# Patient Record
Sex: MALE | Race: WHITE | NOT HISPANIC OR LATINO | Employment: FULL TIME | URBAN - METROPOLITAN AREA
[De-identification: names, ages, dates, MRNs, and addresses within clinical notes are randomized per-mention and may not be internally consistent; named-entity substitution may affect disease eponyms.]

---

## 2023-03-24 ENCOUNTER — HOSPITAL ENCOUNTER (OUTPATIENT)
Dept: DATA CONVERSION | Facility: HOSPITAL | Age: 53
End: 2023-03-24
Attending: STUDENT IN AN ORGANIZED HEALTH CARE EDUCATION/TRAINING PROGRAM | Admitting: STUDENT IN AN ORGANIZED HEALTH CARE EDUCATION/TRAINING PROGRAM
Payer: COMMERCIAL

## 2023-03-24 DIAGNOSIS — Z12.11 ENCOUNTER FOR SCREENING FOR MALIGNANT NEOPLASM OF COLON: ICD-10-CM

## 2023-03-24 DIAGNOSIS — K64.4 RESIDUAL HEMORRHOIDAL SKIN TAGS: ICD-10-CM

## 2023-03-24 DIAGNOSIS — K21.9 GASTRO-ESOPHAGEAL REFLUX DISEASE WITHOUT ESOPHAGITIS: ICD-10-CM

## 2023-03-24 DIAGNOSIS — G47.33 OBSTRUCTIVE SLEEP APNEA (ADULT) (PEDIATRIC): ICD-10-CM

## 2023-03-24 DIAGNOSIS — D12.0 BENIGN NEOPLASM OF CECUM: ICD-10-CM

## 2023-03-24 DIAGNOSIS — J45.909 UNSPECIFIED ASTHMA, UNCOMPLICATED (HHS-HCC): ICD-10-CM

## 2023-03-24 DIAGNOSIS — R12 HEARTBURN: ICD-10-CM

## 2023-03-24 DIAGNOSIS — K29.70 GASTRITIS, UNSPECIFIED, WITHOUT BLEEDING: ICD-10-CM

## 2023-04-04 LAB
COMPLETE PATHOLOGY REPORT: NORMAL
CONVERTED CLINICAL DIAGNOSIS-HISTORY: NORMAL
CONVERTED FINAL DIAGNOSIS: NORMAL
CONVERTED FINAL REPORT PDF LINK TO COPY AND PASTE: NORMAL
CONVERTED GROSS DESCRIPTION: NORMAL

## 2023-04-12 DIAGNOSIS — I49.3 FREQUENT PVCS: Primary | ICD-10-CM

## 2023-04-12 DIAGNOSIS — K21.9 GASTROESOPHAGEAL REFLUX DISEASE WITHOUT ESOPHAGITIS: Primary | ICD-10-CM

## 2023-04-12 RX ORDER — METOPROLOL SUCCINATE 25 MG/1
25 TABLET, EXTENDED RELEASE ORAL DAILY
Qty: 30 TABLET | Refills: 5 | Status: SHIPPED | OUTPATIENT
Start: 2023-04-12 | End: 2023-10-09

## 2023-04-12 RX ORDER — OMEPRAZOLE 20 MG/1
20 CAPSULE, DELAYED RELEASE ORAL DAILY
Qty: 30 CAPSULE | Refills: 5 | Status: SHIPPED | OUTPATIENT
Start: 2023-04-12 | End: 2023-11-13 | Stop reason: WASHOUT

## 2023-09-08 VITALS — HEIGHT: 71 IN | WEIGHT: 315 LBS | BODY MASS INDEX: 44.1 KG/M2

## 2023-09-24 PROBLEM — M54.50 LOWER BACK PAIN: Status: ACTIVE | Noted: 2023-09-24

## 2023-09-24 PROBLEM — R13.12 OROPHARYNGEAL DYSPHAGIA: Status: ACTIVE | Noted: 2023-09-24

## 2023-09-24 PROBLEM — R10.32 LEFT LOWER QUADRANT PAIN: Status: ACTIVE | Noted: 2023-09-24

## 2023-09-24 PROBLEM — R10.32 LEFT INGUINAL PAIN: Status: ACTIVE | Noted: 2023-09-24

## 2023-09-24 PROBLEM — M76.71 PERONEAL TENDONITIS, RIGHT: Status: ACTIVE | Noted: 2023-09-24

## 2023-09-24 PROBLEM — R59.9 SWELLING, LYMPH NODES: Status: ACTIVE | Noted: 2023-09-24

## 2023-09-24 PROBLEM — M47.816 DEGENERATIVE JOINT DISEASE (DJD) OF LUMBAR SPINE: Status: ACTIVE | Noted: 2023-09-24

## 2023-09-24 PROBLEM — E11.9 CONTROLLED DIABETES MELLITUS (MULTI): Status: ACTIVE | Noted: 2023-09-24

## 2023-09-24 PROBLEM — R23.2 FLUSHING: Status: ACTIVE | Noted: 2023-09-24

## 2023-09-24 PROBLEM — R93.5 ABNORMAL ABDOMINAL CT SCAN: Status: ACTIVE | Noted: 2023-09-24

## 2023-09-24 PROBLEM — R19.4 BOWEL HABIT CHANGES: Status: ACTIVE | Noted: 2023-09-24

## 2023-09-24 PROBLEM — K62.5 RECTAL BLEEDING: Status: ACTIVE | Noted: 2023-09-24

## 2023-09-24 PROBLEM — J34.89 NASAL OBSTRUCTION: Status: ACTIVE | Noted: 2023-09-24

## 2023-09-24 PROBLEM — E78.00 HYPERCHOLESTEROLEMIA: Status: ACTIVE | Noted: 2023-09-24

## 2023-09-24 PROBLEM — M79.671 PAIN, FOOT, RIGHT, CHRONIC: Status: ACTIVE | Noted: 2023-09-24

## 2023-09-24 PROBLEM — N45.1 EPIDIDYMITIS, LEFT: Status: ACTIVE | Noted: 2023-09-24

## 2023-09-24 PROBLEM — R10.9 ABDOMINAL PAIN: Status: ACTIVE | Noted: 2023-09-24

## 2023-09-24 PROBLEM — M67.88 ACHILLES TENDONOSIS OF RIGHT LOWER EXTREMITY: Status: ACTIVE | Noted: 2023-09-24

## 2023-09-24 PROBLEM — S80.11XA TRAUMATIC HEMATOMA OF RIGHT LOWER LEG: Status: ACTIVE | Noted: 2023-09-24

## 2023-09-24 PROBLEM — I49.3 FREQUENT PVCS: Status: ACTIVE | Noted: 2023-09-24

## 2023-09-24 PROBLEM — H91.90 HEARING LOSS: Status: ACTIVE | Noted: 2023-09-24

## 2023-09-24 PROBLEM — R51.9 HEADACHE: Status: ACTIVE | Noted: 2023-09-24
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
## 2023-09-24 PROBLEM — E66.01 OBESITY, MORBID, BMI 40.0-49.9 (MULTI): Status: ACTIVE | Noted: 2023-09-24

## 2023-09-24 PROBLEM — L60.1 ONYCHOLYSIS: Status: ACTIVE | Noted: 2023-09-24

## 2023-09-24 PROBLEM — J34.89 NASAL DRAINAGE: Status: ACTIVE | Noted: 2023-09-24

## 2023-09-24 PROBLEM — U07.1 COVID-19: Status: ACTIVE | Noted: 2023-09-24

## 2023-09-24 PROBLEM — H93.13 SUBJECTIVE TINNITUS OF BOTH EARS: Status: ACTIVE | Noted: 2023-09-24

## 2023-09-24 PROBLEM — K59.00 CONSTIPATION: Status: ACTIVE | Noted: 2023-09-24

## 2023-09-24 PROBLEM — H90.5 SENSORINEURAL HEARING LOSS OF RIGHT EAR: Status: ACTIVE | Noted: 2023-09-24

## 2023-09-24 PROBLEM — H90.72 MIXED HEARING LOSS OF LEFT EAR: Status: ACTIVE | Noted: 2023-09-24

## 2023-09-24 PROBLEM — H73.893 RETRACTION OF TYMPANIC MEMBRANE OF BOTH EARS: Status: ACTIVE | Noted: 2023-09-24

## 2023-09-24 PROBLEM — R30.0 DYSURIA: Status: ACTIVE | Noted: 2023-09-24

## 2023-09-24 PROBLEM — I25.10 CAD (CORONARY ARTERY DISEASE): Status: ACTIVE | Noted: 2023-09-24

## 2023-09-24 PROBLEM — R90.89 ABNORMAL BRAIN CT: Status: ACTIVE | Noted: 2023-09-24

## 2023-09-24 PROBLEM — H92.03 OTALGIA, BILATERAL: Status: ACTIVE | Noted: 2023-09-24

## 2023-09-24 PROBLEM — R09.81 NASAL CONGESTION: Status: ACTIVE | Noted: 2023-09-24

## 2023-09-24 PROBLEM — R07.9 CHEST PAIN: Status: ACTIVE | Noted: 2023-09-24

## 2023-09-24 PROBLEM — H74.90: Status: ACTIVE | Noted: 2023-09-24

## 2023-09-24 PROBLEM — G47.30 SLEEP APNEA: Status: ACTIVE | Noted: 2023-09-24

## 2023-09-24 PROBLEM — K21.9 GERD WITHOUT ESOPHAGITIS: Status: ACTIVE | Noted: 2023-09-24

## 2023-09-24 PROBLEM — J98.01 BRONCHOSPASM, ACUTE: Status: ACTIVE | Noted: 2023-09-24

## 2023-09-24 PROBLEM — R07.81 RIB PAIN ON RIGHT SIDE: Status: ACTIVE | Noted: 2023-09-24

## 2023-09-24 PROBLEM — E11.9 DIABETES MELLITUS, NEW ONSET (MULTI): Status: ACTIVE | Noted: 2023-09-24

## 2023-09-24 PROBLEM — R06.09 DYSPNEA ON EXERTION: Status: ACTIVE | Noted: 2023-09-24

## 2023-09-24 PROBLEM — H90.A21 SENSORINEURAL HEARING LOSS (SNHL) OF RIGHT EAR WITH RESTRICTED HEARING OF LEFT EAR: Status: ACTIVE | Noted: 2023-09-24

## 2023-09-24 PROBLEM — M77.32 CALCANEAL SPUR OF BOTH FEET: Status: ACTIVE | Noted: 2023-09-24

## 2023-09-24 PROBLEM — G89.29 PAIN, FOOT, RIGHT, CHRONIC: Status: ACTIVE | Noted: 2023-09-24

## 2023-09-24 PROBLEM — S90.221A SUBUNGUAL HEMATOMA OF TOE, RIGHT, INITIAL ENCOUNTER: Status: ACTIVE | Noted: 2023-09-24

## 2023-09-24 PROBLEM — H92.13 OTORRHEA OF BOTH EARS: Status: ACTIVE | Noted: 2023-09-24

## 2023-09-24 PROBLEM — M77.31 CALCANEAL SPUR OF BOTH FEET: Status: ACTIVE | Noted: 2023-09-24

## 2023-09-24 RX ORDER — ALBUTEROL SULFATE 90 UG/1
1-2 AEROSOL, METERED RESPIRATORY (INHALATION) EVERY 4 HOURS PRN
COMMUNITY
End: 2023-12-19 | Stop reason: SDUPTHER

## 2023-09-24 RX ORDER — OXYCODONE AND ACETAMINOPHEN 5; 325 MG/1; MG/1
1-2 TABLET ORAL EVERY 6 HOURS
COMMUNITY
Start: 2016-10-14 | End: 2023-10-24 | Stop reason: WASHOUT

## 2023-09-24 RX ORDER — AZELASTINE 1 MG/ML
1 SPRAY, METERED NASAL 2 TIMES DAILY
COMMUNITY
Start: 2023-02-08 | End: 2024-05-06 | Stop reason: SDUPTHER

## 2023-09-24 RX ORDER — ROSUVASTATIN CALCIUM 20 MG/1
20 TABLET, COATED ORAL DAILY
COMMUNITY
Start: 2023-07-07 | End: 2024-02-06

## 2023-09-24 RX ORDER — FLUTICASONE PROPIONATE AND SALMETEROL 50; 250 UG/1; UG/1
1 POWDER RESPIRATORY (INHALATION) 2 TIMES DAILY
COMMUNITY
Start: 2022-12-21 | End: 2023-10-24 | Stop reason: WASHOUT

## 2023-09-24 RX ORDER — TRIAMCINOLONE ACETONIDE 55 UG/1
2 SPRAY, METERED NASAL DAILY
COMMUNITY
Start: 2022-04-18 | End: 2023-10-24 | Stop reason: WASHOUT

## 2023-09-24 RX ORDER — OMEPRAZOLE 40 MG/1
40 CAPSULE, DELAYED RELEASE ORAL
COMMUNITY
Start: 2023-01-04 | End: 2024-01-19 | Stop reason: ALTCHOICE

## 2023-09-24 RX ORDER — BENZONATATE 200 MG/1
200 CAPSULE ORAL 3 TIMES DAILY PRN
COMMUNITY
Start: 2023-02-19 | End: 2023-10-24 | Stop reason: WASHOUT

## 2023-09-24 RX ORDER — ETODOLAC 400 MG/1
400 TABLET, FILM COATED ORAL
COMMUNITY
Start: 2022-03-02 | End: 2023-10-24 | Stop reason: WASHOUT

## 2023-09-24 RX ORDER — IBUPROFEN 600 MG/1
600 TABLET ORAL EVERY 6 HOURS
COMMUNITY
Start: 2016-10-14

## 2023-09-24 RX ORDER — METOPROLOL SUCCINATE 25 MG/1
25 TABLET, EXTENDED RELEASE ORAL DAILY
COMMUNITY
Start: 2023-04-27 | End: 2023-10-24 | Stop reason: WASHOUT

## 2023-09-25 PROBLEM — I49.9 ARRHYTHMIA: Status: ACTIVE | Noted: 2023-09-25

## 2023-09-25 PROBLEM — J45.909 UNCOMPLICATED ASTHMA (HHS-HCC): Status: ACTIVE | Noted: 2023-09-25

## 2023-09-25 PROBLEM — J34.89 NASAL DRAINAGE: Status: RESOLVED | Noted: 2023-09-24 | Resolved: 2023-09-25

## 2023-09-25 PROBLEM — J45.909 ASTHMA (HHS-HCC): Status: ACTIVE | Noted: 2023-09-25

## 2023-09-25 PROBLEM — R06.02 SHORTNESS OF BREATH ON EXERTION: Status: ACTIVE | Noted: 2023-09-25

## 2023-09-25 PROBLEM — J32.9 CHRONIC SINUSITIS: Status: ACTIVE | Noted: 2023-09-25

## 2023-09-25 PROBLEM — R06.02 SHORTNESS OF BREATH: Status: ACTIVE | Noted: 2023-09-25

## 2023-09-25 PROBLEM — H70.10 CHRONIC MASTOIDITIS: Status: ACTIVE | Noted: 2023-09-25

## 2023-09-25 PROBLEM — J31.0 CHRONIC RHINITIS: Status: ACTIVE | Noted: 2023-09-25

## 2023-09-25 PROBLEM — M19.071 OSTEOARTHRITIS OF FOOT, RIGHT: Status: ACTIVE | Noted: 2023-09-25

## 2023-09-25 PROBLEM — D12.6 ADENOMATOUS POLYP OF COLON: Status: ACTIVE | Noted: 2023-09-25

## 2023-09-25 PROBLEM — J45.901 ASTHMA EXACERBATION (HHS-HCC): Status: ACTIVE | Noted: 2023-09-25

## 2023-10-21 ENCOUNTER — LAB (OUTPATIENT)
Dept: LAB | Facility: LAB | Age: 53
End: 2023-10-21
Payer: COMMERCIAL

## 2023-10-21 DIAGNOSIS — E78.00 PURE HYPERCHOLESTEROLEMIA, UNSPECIFIED: Primary | ICD-10-CM

## 2023-10-21 LAB
ALBUMIN SERPL BCP-MCNC: 4.2 G/DL (ref 3.4–5)
ALP SERPL-CCNC: 109 U/L (ref 33–120)
ALT SERPL W P-5'-P-CCNC: 13 U/L (ref 10–52)
ANION GAP SERPL CALC-SCNC: 12 MMOL/L (ref 10–20)
AST SERPL W P-5'-P-CCNC: 15 U/L (ref 9–39)
BILIRUB SERPL-MCNC: 0.3 MG/DL (ref 0–1.2)
BUN SERPL-MCNC: 22 MG/DL (ref 6–23)
CALCIUM SERPL-MCNC: 9 MG/DL (ref 8.6–10.3)
CHLORIDE SERPL-SCNC: 106 MMOL/L (ref 98–107)
CHOLEST SERPL-MCNC: 129 MG/DL (ref 0–199)
CHOLESTEROL/HDL RATIO: 3.1
CO2 SERPL-SCNC: 27 MMOL/L (ref 21–32)
CREAT SERPL-MCNC: 0.82 MG/DL (ref 0.5–1.3)
GFR SERPL CREATININE-BSD FRML MDRD: >90 ML/MIN/1.73M*2
GLUCOSE SERPL-MCNC: 127 MG/DL (ref 74–99)
HDLC SERPL-MCNC: 41.6 MG/DL
LDLC SERPL CALC-MCNC: 66 MG/DL
NON HDL CHOLESTEROL: 87 MG/DL (ref 0–149)
POTASSIUM SERPL-SCNC: 4.7 MMOL/L (ref 3.5–5.3)
PROT SERPL-MCNC: 7.1 G/DL (ref 6.4–8.2)
SODIUM SERPL-SCNC: 140 MMOL/L (ref 136–145)
TRIGL SERPL-MCNC: 106 MG/DL (ref 0–149)
VLDL: 21 MG/DL (ref 0–40)

## 2023-10-21 PROCEDURE — 36415 COLL VENOUS BLD VENIPUNCTURE: CPT

## 2023-10-21 PROCEDURE — 80061 LIPID PANEL: CPT

## 2023-10-21 PROCEDURE — 80053 COMPREHEN METABOLIC PANEL: CPT

## 2023-10-24 ENCOUNTER — OFFICE VISIT (OUTPATIENT)
Dept: CARDIOLOGY | Facility: CLINIC | Age: 53
End: 2023-10-24
Payer: COMMERCIAL

## 2023-10-24 VITALS
BODY MASS INDEX: 45.1 KG/M2 | OXYGEN SATURATION: 92 % | SYSTOLIC BLOOD PRESSURE: 146 MMHG | WEIGHT: 315 LBS | HEART RATE: 88 BPM | HEIGHT: 70 IN | DIASTOLIC BLOOD PRESSURE: 66 MMHG

## 2023-10-24 DIAGNOSIS — I25.10 CORONARY ARTERY DISEASE INVOLVING NATIVE CORONARY ARTERY OF NATIVE HEART WITHOUT ANGINA PECTORIS: Primary | ICD-10-CM

## 2023-10-24 DIAGNOSIS — E78.00 HYPERCHOLESTEROLEMIA: ICD-10-CM

## 2023-10-24 PROCEDURE — 3048F LDL-C <100 MG/DL: CPT | Performed by: PHYSICIAN ASSISTANT

## 2023-10-24 PROCEDURE — 3077F SYST BP >= 140 MM HG: CPT | Performed by: PHYSICIAN ASSISTANT

## 2023-10-24 PROCEDURE — 3078F DIAST BP <80 MM HG: CPT | Performed by: PHYSICIAN ASSISTANT

## 2023-10-24 PROCEDURE — 3008F BODY MASS INDEX DOCD: CPT | Performed by: PHYSICIAN ASSISTANT

## 2023-10-24 PROCEDURE — 99203 OFFICE O/P NEW LOW 30 MIN: CPT | Performed by: PHYSICIAN ASSISTANT

## 2023-10-24 NOTE — PROGRESS NOTES
"Chief Complaint:   Follow-up (Patient is here today for a 3 month follow up on labs )     History Of Present Illness:    Chavo Mariscal is a 52 y.o. male presenting with moderately elevated CAC score and recently starting rosuvastatin 20mg at bedtime.  Patient denies any adverse effects to statin therapy, repeat CMP displays normal transaminase levels, and LDL-C is 66 mg/dL.  Patient remains asymptomatic from a functional standpoint without exertional dyspnea and/or classic angina.  Patient denies chest pain, chest pressure, palpitations, dyspnea on exertion, shortness of breath at rest, diaphoresis, nausea/vomiting, back pain, headache, lightheadedness, dizziness, syncope or presyncopal episodes, active bleeding signs or symptoms, excessive weight gain, muscle or joint pain, claudication.     Last Recorded Vitals:  Vitals:    10/24/23 1528   BP: 146/66   BP Location: Left arm   Patient Position: Sitting   BP Cuff Size: Large adult   Weight: (!) 151 kg (333 lb)   Height: 1.778 m (5' 10\")       Past Medical History:  He has a past medical history of Mixed conductive and sensorineural hearing loss, unspecified, Morbid (severe) obesity due to excess calories (CMS/HCC), Obstructive sleep apnea (adult) (pediatric), Periodic limb movement disorder, Personal history of other diseases of the circulatory system, Personal history of other diseases of the digestive system, Personal history of other diseases of the musculoskeletal system and connective tissue, Personal history of other diseases of the respiratory system, and Type 2 diabetes mellitus without complications (CMS/HCC) (07/20/2021).    Past Surgical History:  He has a past surgical history that includes Other surgical history (12/20/2019); Other surgical history (01/16/2019); Other surgical history (01/16/2019); Inner ear surgery (08/22/2017); and Hernia repair (08/22/2017).      Social History:  He reports that he has an unknown smoking status. He has been exposed " to tobacco smoke. He has never used smokeless tobacco. Alcohol use questions deferred to the physician. He reports that he does not use drugs.    Family History:  Family History   Problem Relation Name Age of Onset    Diabetes Mother      Other (Malignant neoplasm of breast) Mother      Other (Cerebrovascular accident (CVA)) Father      Colon cancer Other Grandparent         Allergies:  Patient has no known allergies.    Outpatient Medications:  Current Outpatient Medications   Medication Instructions    albuterol 90 mcg/actuation inhaler 1-2 puffs, inhalation, Every 4 hours PRN    azelastine (Astelin) 137 mcg (0.1 %) nasal spray 1 spray, nasal, 2 times daily    ibuprofen (IBU) 600 mg, oral, Every 6 hours    metoprolol succinate XL (TOPROL-XL) 25 mg, oral, Daily, Do not crush or chew.     omeprazole (PRILOSEC) 20 mg, oral, Daily, Do not crush or chew.     omeprazole (PRILOSEC) 40 mg, oral, 30 min before biggest meal    rosuvastatin (CRESTOR) 20 mg, oral, Daily       Physical Exam:  Constitutional: awake and alert, oriented ×3, no apparent distress  Skin: warm, dry, good turgor no obvious lesions  Eyes: pupils equal, round, reactive to light, conjunctiva pink and noninjected, no discharge  HENT: normocephalic and atraumatic, mucous membranes moist, trachea midline with no masses/goiter  Cardiovascular: S1/S2 regular, no murmur no rubs/gallops, no carotid bruits, no JVD  Pulmonary: symmetrical chest expansion, lungs are clear to auscultation bilaterally, no wheezes/rales/rhonchi, normal effort  Abdomen: nontender, nondistended, active bowel sounds, no ascites  Extremities: no cyanosis, clubbing, no LE edema no lesions; palpable pedal pulses  Neurologic: cranial nerves II - XII grossly intact, stable gait, no tremor       Last Labs:  CBC -  Lab Results   Component Value Date    WBC 11.2 11/18/2022    HGB 13.1 (L) 11/18/2022    HCT 40.5 (L) 11/18/2022    MCV 83 11/18/2022     11/18/2022       CMP -  Lab Results  "  Component Value Date    CALCIUM 9.0 10/21/2023    PROT 7.1 10/21/2023    ALBUMIN 4.2 10/21/2023    AST 15 10/21/2023    ALT 13 10/21/2023    ALKPHOS 109 10/21/2023    BILITOT 0.3 10/21/2023       LIPID PANEL -   Lab Results   Component Value Date    CHOL 129 10/21/2023    TRIG 106 10/21/2023    HDL 41.6 10/21/2023    CHHDL 3.1 10/21/2023    VLDL 21 10/21/2023    NHDL 87 10/21/2023       RENAL FUNCTION PANEL -   Lab Results   Component Value Date    GLUCOSE 127 (H) 10/21/2023     10/21/2023    K 4.7 10/21/2023     10/21/2023    CO2 27 10/21/2023    ANIONGAP 12 10/21/2023    BUN 22 10/21/2023    CREATININE 0.82 10/21/2023    GFRMALE >90 11/18/2022    CALCIUM 9.0 10/21/2023    ALBUMIN 4.2 10/21/2023        Lab Results   Component Value Date    BNP 12 11/18/2022    HGBA1C 6.9 07/29/2021       Last Cardiology Tests:  ECG:  No results found for this or any previous visit from the past 1095 days.      Echo:  No results found for this or any previous visit from the past 1095 days.      Ejection Fractions:  No results found for: \"EF\"    Cath:  No results found for this or any previous visit from the past 1095 days.      Stress Test:  No results found for this or any previous visit from the past 1095 days.      Cardiac Imaging:  CT cardiac scoring wo IV contrast 7/5/2023            Assessment/Plan   Problem List Items Addressed This Visit             ICD-10-CM       Cardiac and Vasculature    CAD (coronary artery disease) - Primary I25.10    Hypercholesterolemia E78.00     -ASA 81mg every day, continue rosuvastatin 20mg at bedtime    -F/U with Dr. Pinedo 1 year    Conrad Mitchell PA-C  "

## 2023-11-13 ENCOUNTER — OFFICE VISIT (OUTPATIENT)
Dept: PRIMARY CARE | Facility: CLINIC | Age: 53
End: 2023-11-13
Payer: COMMERCIAL

## 2023-11-13 VITALS
HEIGHT: 70 IN | DIASTOLIC BLOOD PRESSURE: 85 MMHG | HEART RATE: 55 BPM | BODY MASS INDEX: 47.78 KG/M2 | OXYGEN SATURATION: 92 % | SYSTOLIC BLOOD PRESSURE: 131 MMHG

## 2023-11-13 DIAGNOSIS — E11.9 TYPE 2 DIABETES MELLITUS WITHOUT COMPLICATION, WITHOUT LONG-TERM CURRENT USE OF INSULIN (MULTI): Primary | ICD-10-CM

## 2023-11-13 DIAGNOSIS — R00.0 TACHYCARDIA: ICD-10-CM

## 2023-11-13 DIAGNOSIS — H53.8 BLURRED VISION: ICD-10-CM

## 2023-11-13 DIAGNOSIS — E66.01 OBESITY, MORBID, BMI 40.0-49.9 (MULTI): ICD-10-CM

## 2023-11-13 DIAGNOSIS — R23.8 SCALP IRRITATION: ICD-10-CM

## 2023-11-13 LAB — POC HEMOGLOBIN A1C: 6.7 % (ref 4.2–6.5)

## 2023-11-13 PROCEDURE — 3075F SYST BP GE 130 - 139MM HG: CPT | Performed by: INTERNAL MEDICINE

## 2023-11-13 PROCEDURE — 3048F LDL-C <100 MG/DL: CPT | Performed by: INTERNAL MEDICINE

## 2023-11-13 PROCEDURE — 90750 HZV VACC RECOMBINANT IM: CPT | Performed by: INTERNAL MEDICINE

## 2023-11-13 PROCEDURE — 90471 IMMUNIZATION ADMIN: CPT | Performed by: INTERNAL MEDICINE

## 2023-11-13 PROCEDURE — 3008F BODY MASS INDEX DOCD: CPT | Performed by: INTERNAL MEDICINE

## 2023-11-13 PROCEDURE — 3079F DIAST BP 80-89 MM HG: CPT | Performed by: INTERNAL MEDICINE

## 2023-11-13 PROCEDURE — 83036 HEMOGLOBIN GLYCOSYLATED A1C: CPT | Performed by: INTERNAL MEDICINE

## 2023-11-13 PROCEDURE — 99214 OFFICE O/P EST MOD 30 MIN: CPT | Performed by: INTERNAL MEDICINE

## 2023-11-13 RX ORDER — ASPIRIN 81 MG/1
81 TABLET ORAL DAILY
Qty: 30 TABLET | Refills: 11
Start: 2023-11-13 | End: 2024-11-12

## 2023-11-13 RX ORDER — ATENOLOL 25 MG/1
25 TABLET ORAL DAILY
Qty: 30 TABLET | Refills: 5 | Status: SHIPPED | OUTPATIENT
Start: 2023-11-13 | End: 2024-05-22

## 2023-11-13 ASSESSMENT — PATIENT HEALTH QUESTIONNAIRE - PHQ9
SUM OF ALL RESPONSES TO PHQ9 QUESTIONS 1 AND 2: 0
2. FEELING DOWN, DEPRESSED OR HOPELESS: NOT AT ALL
1. LITTLE INTEREST OR PLEASURE IN DOING THINGS: NOT AT ALL

## 2023-11-13 NOTE — PATIENT INSTRUCTIONS
Plan  A1C today   Shingrix shot now and in 6 months  Dermatology ref done for scalp irritation   He will get new glasses soon . Had exam done few weeks ago   Started on new med for pulsatile noises in head and ears .   Discussed about obesity and complications related to it. Discussed about weight reduction and regular exercise to decrease weight. Questions related to it answered to patient's satisfaction.  Bp and heart rate check by nurse in 4-6 weeks.  F/U in 6 months or as needed

## 2023-11-13 NOTE — PROGRESS NOTES
"My nurse note reviewed. Patient is here for:  Follow-up (Bump on right. No insight on how long its been there. /Headaches (every once in awhile lighthyeadeness)/Legs sore from hip down (3 weeks) cramping is present in calf /Dry throat feels swollen and occasional difficulties swallowing./Frequent urination.)   Noted above  Wants to see dermatologist for scalp irritation . Uses special shampoo  Felt like lump on R side of neck  , no pain , fever, chills.  Getting headache, gushing sound in ears, feels pulses in ears as per him. Had eye check up and new glasses have been rxed. Occ feels like blurred vision   Patient denies any shortness of breath, PND, orthopnea, chest pain , palpitation, syncope or edema in legs  Was seen by heart doctor for mildly high calcium score , on ruy med    OBJECTIVE :  /85   Pulse 55   Ht 1.778 m (5' 10\")   SpO2 92%   BMI 47.78 kg/m²   CVS: S1 S2 + , no S3. No loud heart murmur appreciated. Lungs clear, No edema  Morbidly obese  No lump felt on R side of neck but what he feels is like part of sternocleidomastoid muscle .   No sinus tendnerness  CNS: Patient is alert, oriented moving all 4 extremities well. No motor weakness noted on gross neurological exam. No involuntary movements or tremors noted.      Assessment:  1. Type 2 diabetes mellitus without complication, without long-term current use of insulin (CMS/[facility]) -controlled     2. Blurred vision -saw eye doctor   3. Obesity, morbid, BMI 40.0-49.9 (CMS/HCC) -wt reduction   4. Tachycardia - feels like pulsatile pulse feeling in ears , gushing sound. Will try with B blocker     Plan  A1C today - 6.7 . Discussed with pt  Shingrix shot now and in 6 months  Dermatology ref done for scalp irritation   He will get new glasses soon . Had exam done few weeks ago   Started on new med for pulsatile noises in head and ears .   Discussed about obesity and complications related to it. Discussed about weight reduction and regular exercise " to decrease weight. Questions related to it answered to patient's satisfaction.  Bp and heart rate check by nurse in 4-6 weeks.  F/U in 6 months or as needed

## 2023-12-19 ENCOUNTER — OFFICE VISIT (OUTPATIENT)
Dept: PRIMARY CARE | Facility: CLINIC | Age: 53
End: 2023-12-19
Payer: COMMERCIAL

## 2023-12-19 VITALS
SYSTOLIC BLOOD PRESSURE: 134 MMHG | OXYGEN SATURATION: 97 % | HEART RATE: 78 BPM | TEMPERATURE: 96.8 F | DIASTOLIC BLOOD PRESSURE: 82 MMHG

## 2023-12-19 DIAGNOSIS — R06.09 DYSPNEA ON EXERTION: ICD-10-CM

## 2023-12-19 DIAGNOSIS — F41.9 ANXIETY: ICD-10-CM

## 2023-12-19 DIAGNOSIS — R00.0 TACHYCARDIA: Primary | ICD-10-CM

## 2023-12-19 PROCEDURE — 99213 OFFICE O/P EST LOW 20 MIN: CPT | Performed by: INTERNAL MEDICINE

## 2023-12-19 PROCEDURE — 3008F BODY MASS INDEX DOCD: CPT | Performed by: INTERNAL MEDICINE

## 2023-12-19 PROCEDURE — 3048F LDL-C <100 MG/DL: CPT | Performed by: INTERNAL MEDICINE

## 2023-12-19 PROCEDURE — 3075F SYST BP GE 130 - 139MM HG: CPT | Performed by: INTERNAL MEDICINE

## 2023-12-19 PROCEDURE — 3079F DIAST BP 80-89 MM HG: CPT | Performed by: INTERNAL MEDICINE

## 2023-12-19 RX ORDER — ALBUTEROL SULFATE 90 UG/1
1-2 AEROSOL, METERED RESPIRATORY (INHALATION) EVERY 4 HOURS PRN
Qty: 18 G | Refills: 3 | Status: SHIPPED | OUTPATIENT
Start: 2023-12-19

## 2023-12-19 NOTE — PATIENT INSTRUCTIONS
Plan  BP and heart rate under good control  Current medications are effective. advised to continue current medications.  Discussed about med for anxiety , he would  like to hold off of it for now. Will let me know if changes his mind  Discussed about obesity and complications related to it. Discussed about weight reduction and regular exercise to decrease weight. Questions related to it answered to patient's satisfaction.  F/U 5 months or as needed

## 2023-12-19 NOTE — PROGRESS NOTES
Subjective   HPI: Chavo Mariscal is a 53 y.o. male is a new patient here for evaluation and treatment of irritated scalp.     ROS: No other skin or systemic complaints other than what is documented elsewhere in the note.    ALLERGIES: Patient has no known allergies.    SOCIAL:  reports that he has an unknown smoking status. He has been exposed to tobacco smoke. He has never used smokeless tobacco. Alcohol use questions deferred to the physician. He reports that he does not use drugs.    Objective       Assessment/Plan        FOLLOW UP: ***    The patient was encouraged to contact me with any further questions or concerns.  Elana Rocha PA-C  12/19/2023

## 2023-12-19 NOTE — PROGRESS NOTES
My nurse note reviewed. Patient is here for:  Follow-up (Bp check and heartrate)   He has been doing fine but today as he had to come here again started to feel gushing noise and pulses in ears , some palpitation like feeling, some anxiousness. Thinking now that all his symptoms could be from anxiety   Patient denies any shortness of breath, PND, orthopnea, chest pain , syncope or edema in legs  Tolerating atenolol well. No side effects  Does some walking exercise  OBJECTIVE :  /82   Pulse 78   Temp 36 °C (96.8 °F)   SpO2 97%   Morbidly obese  CVS: S1 S2 + , no S3. No loud heart murmur appreciated. Lungs clear, No edema      Assessment:  1. Tachycardia  Better ? Anxiety related       2. Dyspnea on exertion  albuterol 90 mcg/actuation inhaler      3. Anxiety  Declines any med for now        Plan  BP and heart rate under good control  Will do 2nd shingle shot on next visit   Current medications are effective. advised to continue current medications.  Discussed about med for anxiety , he would  like to hold off of it for now. Will let me know if changes his mind  Discussed about obesity and complications related to it. Discussed about weight reduction and regular exercise to decrease weight. Questions related to it answered to patient's satisfaction.  F/U 5 months or as needed

## 2024-01-02 ENCOUNTER — APPOINTMENT (OUTPATIENT)
Dept: DERMATOLOGY | Facility: CLINIC | Age: 54
End: 2024-01-02
Payer: COMMERCIAL

## 2024-01-08 ENCOUNTER — APPOINTMENT (OUTPATIENT)
Dept: DERMATOLOGY | Facility: CLINIC | Age: 54
End: 2024-01-08
Payer: COMMERCIAL

## 2024-01-10 NOTE — PROGRESS NOTES
Scalp  - been present since end of summer  -bleed and scab and cycle repeats  -tried medicated head and shoulder, tea tree shampoo  -extremely pruritic      Back  - single mole  -sometimes it hurts  -present x couple of months  -never bleeds or itches  -no growth  -no fhx of skin ca  -no phx of skin ca    Skin tags  -both armpits  -not painful      Subjective   HPI: Chavo Mariscal is a 53 y.o. male is a new patient here for evaluation and treatment of irritated scalp, a single mole on the back, and skin tags of bilateral axilla.     Scalp  - been present since end of summer  -bleed and scab and cycle repeats  -tried medicated head and shoulder, tea tree shampoo  -extremely pruritic      Back  - single mole  -sometimes it hurts  -present x couple of months  -never bleeds or itches  -no growth  -no fhx of skin ca  -no phx of skin ca    Skin tags  -both armpits  -not painful    ROS: No other skin or systemic complaints other than what is documented elsewhere in the note.    ALLERGIES: Patient has no known allergies.    SOCIAL:  reports that he has an unknown smoking status. He has been exposed to tobacco smoke. He has never used smokeless tobacco. Alcohol use questions deferred to the physician. He reports that he does not use drugs.    Objective   Scalp  Symmetric erythematous patches overlying greasy scales    Mid Back  0.6 cm fleshy papule with a base that has spotty light brown pigmentation    Left Axilla, Right Axilla  Fleshy, skin-colored sessile and pedunculated Not inflamed papules.           Assessment/Plan   1. Seborrheic dermatitis  Scalp    Start:  Ciclopirox shampoo  Fluocinonide solution    Discussed nature of seborrheic dermatitis with patient.  I recommended starting treatment with ciclopirox shampoo and fluocinonide solution.  Discussed the importance of washing hair every day.  Patient verbalizes understanding and opts for treatment today.     Related Medications  ciclopirox 1 % shampoo  Wet hair and  apply shampoo to scalp. Lather and leave on for 3 minutes. Rinse. Do this twice a week at night.    fluocinonide (Lidex) 0.05 % external solution  Apply topically 2 times a day. Apply topically to scalp BID.    2. Atypical nevus  Mid Back    I recommended shave removal because of the pigment present at the base of the lesion.  Discussed wound care with patient including applying topical bacitracin or Polysporin for the next 5 days.    I will call patient with biopsy results.    Shave removal - Mid Back    Specimen 1 - Dermatopathology- DERM LAB  Differential Diagnosis: atypical nevus   Check Margins Yes/No?:    Comments:    Dermpath Lab: Routine Histopathology (formalin-fixed tissue)    3. Skin tag (2)  Left Axilla; Right Axilla    Discussed benign nature of skin tags with patient and how they are, and skin fold areas where friction occurs.  If patient wants removal please schedule for hyfrecation.         FOLLOW UP: 4 weeks-seborrheic dermatitis check    The patient was encouraged to contact me with any further questions or concerns.  Elana Rocha PA-C  1/29/2024

## 2024-01-17 ENCOUNTER — ANCILLARY PROCEDURE (OUTPATIENT)
Dept: RADIOLOGY | Facility: CLINIC | Age: 54
End: 2024-01-17
Payer: COMMERCIAL

## 2024-01-17 DIAGNOSIS — M79.672 LEFT FOOT PAIN: ICD-10-CM

## 2024-01-17 PROCEDURE — 73630 X-RAY EXAM OF FOOT: CPT | Mod: LEFT SIDE | Performed by: RADIOLOGY

## 2024-01-17 PROCEDURE — 73630 X-RAY EXAM OF FOOT: CPT | Mod: LT

## 2024-01-19 ENCOUNTER — OFFICE VISIT (OUTPATIENT)
Dept: PODIATRY | Facility: CLINIC | Age: 54
End: 2024-01-19
Payer: COMMERCIAL

## 2024-01-19 DIAGNOSIS — S92.512A CLOSED DISPLACED FRACTURE OF PROXIMAL PHALANX OF LESSER TOE OF LEFT FOOT, INITIAL ENCOUNTER: Primary | ICD-10-CM

## 2024-01-19 DIAGNOSIS — M79.672 LEFT FOOT PAIN: ICD-10-CM

## 2024-01-19 PROBLEM — R09.81 NASAL CONGESTION: Status: ACTIVE | Noted: 2024-01-19

## 2024-01-19 PROBLEM — K29.70 GASTRITIS: Status: ACTIVE | Noted: 2023-03-24

## 2024-01-19 PROBLEM — M76.71 PERONEAL TENDINITIS OF RIGHT LOWER EXTREMITY: Status: ACTIVE | Noted: 2024-01-19

## 2024-01-19 PROBLEM — E66.01 MORBID OBESITY (MULTI): Status: ACTIVE | Noted: 2024-01-19

## 2024-01-19 PROBLEM — R00.2 PALPITATIONS: Status: ACTIVE | Noted: 2022-11-19

## 2024-01-19 PROBLEM — M79.671 CHRONIC PAIN IN RIGHT FOOT: Status: ACTIVE | Noted: 2024-01-19

## 2024-01-19 PROBLEM — E66.01 SEVERE OBESITY (MULTI): Status: ACTIVE | Noted: 2024-01-19

## 2024-01-19 PROBLEM — G89.29 CHRONIC PAIN IN RIGHT FOOT: Status: ACTIVE | Noted: 2024-01-19

## 2024-01-19 PROBLEM — U07.1 DISEASE DUE TO SEVERE ACUTE RESPIRATORY SYNDROME CORONAVIRUS 2 (SARS-COV-2): Status: ACTIVE | Noted: 2024-01-19

## 2024-01-19 PROBLEM — T14.8XXA TRAUMATIC HEMATOMA: Status: ACTIVE | Noted: 2024-01-19

## 2024-01-19 PROBLEM — R23.8 SKIN IRRITATION: Status: ACTIVE | Noted: 2024-01-19

## 2024-01-19 PROBLEM — M77.30 CALCANEAL SPUR: Status: ACTIVE | Noted: 2024-01-19

## 2024-01-19 PROBLEM — D12.0 BENIGN NEOPLASM OF CECUM: Status: ACTIVE | Noted: 2023-03-24

## 2024-01-19 PROBLEM — K62.5 RECTAL HEMORRHAGE: Status: ACTIVE | Noted: 2023-09-24

## 2024-01-19 PROBLEM — K64.4 RESIDUAL HEMORRHOIDAL SKIN TAGS: Status: ACTIVE | Noted: 2023-03-24

## 2024-01-19 PROBLEM — M54.50 LOW BACK PAIN: Status: ACTIVE | Noted: 2024-01-19

## 2024-01-19 PROBLEM — F41.9 ANXIETY: Status: ACTIVE | Noted: 2024-01-19

## 2024-01-19 PROBLEM — H53.8 BLURRING OF VISUAL IMAGE: Status: ACTIVE | Noted: 2024-01-19

## 2024-01-19 PROBLEM — Z20.822 CONTACT WITH AND (SUSPECTED) EXPOSURE TO COVID-19: Status: ACTIVE | Noted: 2022-11-19

## 2024-01-19 PROCEDURE — 3008F BODY MASS INDEX DOCD: CPT | Performed by: PODIATRIST

## 2024-01-19 PROCEDURE — 99213 OFFICE O/P EST LOW 20 MIN: CPT | Performed by: PODIATRIST

## 2024-01-19 NOTE — PROGRESS NOTES
Chief Complaint   Patient presents with    Foot Injury     Left pinky toe injury after kicking door frame in the house   Patient is complaining of painful fifth left digit.  Patient hit his toe on the door frame and noticed that it was sticking out at an angle.  Patient said that as he started to walk the toe drifted back into the normal position.  He developed edema and ecchymosis in that area.  He had x-rays taken which were positive for fracture of the proximal phalanx of the fifth left digit.  He was placed in a surgical shoe which has significantly helped his pain.  He also is wearing an Ace bandage.  He is here for follow-up.  Patient is here with his wife.    Physical exam  Patient is alert, oriented, no acute distress    Anatomical position of the fifth left digit  Pain on palpation of the proximal phalanx of the fifth left digit, the fifth MPJ, and the distal shaft of the fifth metatarsal and head.    There is mild edema noted over the fifth metatarsal and a moderate amount of edema involving the fifth left digit.  Faint ecchymosis is noted over the fifth MPJ left  Gross motor is intact  +2/4 pedal pulses left foot     === 01/17/24 ===    XR FOOT 3+ VIEWS LEFT    Addendum 1/17/2024 12:16 PM ------------------------------------------------  Interpreted By:  Schoenberger, Joseph,  ADDENDUM:  There is an undisplaced fracture at the proximal metaphysis of the  proximal phalanx of the 5th toe.    Signed by: Joseph Schoenberger 1/17/2024 12:16 PM    -------- ORIGINAL REPORT --------  Dictation workstation:   [id number]    - Impression -  No acute findings.      MACRO:  None    Signed by: Joseph Schoenberger 1/17/2024 11:58 AM  Dictation workstation:   [id number]     Assessment and plan  #1 Fracture of the proximal phalanx of the fifth left digit  Reviewed x-rays in detail with patient and family  All questions asked and answered  Patient instructed on shelia splinting digits 5 and 4 with Coban  Patient to  continue to utilize a surgical shoe  Discussed that the fracture will take 6 to 8 weeks to heal in total  Patient may be able to transition out of surgical shoe in 2 weeks  Rest, ice, over-the-counter anti-inflammatories as needed  Follow-up 3 weeks, serial x-rays next visit

## 2024-01-21 DIAGNOSIS — K21.9 GASTROESOPHAGEAL REFLUX DISEASE WITHOUT ESOPHAGITIS: ICD-10-CM

## 2024-01-22 RX ORDER — OMEPRAZOLE 20 MG/1
20 CAPSULE, DELAYED RELEASE ORAL DAILY
Qty: 30 CAPSULE | Refills: 0 | Status: SHIPPED | OUTPATIENT
Start: 2024-01-22 | End: 2024-02-26 | Stop reason: SDUPTHER

## 2024-01-29 ENCOUNTER — OFFICE VISIT (OUTPATIENT)
Dept: DERMATOLOGY | Facility: CLINIC | Age: 54
End: 2024-01-29
Payer: COMMERCIAL

## 2024-01-29 DIAGNOSIS — D22.9 ATYPICAL NEVUS: ICD-10-CM

## 2024-01-29 DIAGNOSIS — L91.8 SKIN TAG: ICD-10-CM

## 2024-01-29 DIAGNOSIS — L21.9 SEBORRHEIC DERMATITIS: Primary | ICD-10-CM

## 2024-01-29 PROCEDURE — 11301 SHAVE SKIN LESION 0.6-1.0 CM: CPT

## 2024-01-29 PROCEDURE — 99203 OFFICE O/P NEW LOW 30 MIN: CPT

## 2024-01-29 PROCEDURE — 88305 TISSUE EXAM BY PATHOLOGIST: CPT | Performed by: DERMATOLOGY

## 2024-01-29 PROCEDURE — 3008F BODY MASS INDEX DOCD: CPT

## 2024-01-29 RX ORDER — CICLOPIROX 1 G/100ML
SHAMPOO TOPICAL
Qty: 120 ML | Refills: 11 | Status: SHIPPED | OUTPATIENT
Start: 2024-01-29

## 2024-01-29 RX ORDER — FLUOCINONIDE TOPICAL SOLUTION USP, 0.05% 0.5 MG/ML
SOLUTION TOPICAL 2 TIMES DAILY
Qty: 60 ML | Refills: 11 | Status: SHIPPED | OUTPATIENT
Start: 2024-01-29 | End: 2024-02-29 | Stop reason: ALTCHOICE

## 2024-01-31 LAB
LABORATORY COMMENT REPORT: NORMAL
PATH REPORT.FINAL DX SPEC: NORMAL
PATH REPORT.GROSS SPEC: NORMAL
PATH REPORT.MICROSCOPIC SPEC OTHER STN: NORMAL
PATH REPORT.RELEVANT HX SPEC: NORMAL
PATH REPORT.TOTAL CANCER: NORMAL

## 2024-02-05 ENCOUNTER — HOSPITAL ENCOUNTER (OUTPATIENT)
Dept: RADIOLOGY | Facility: HOSPITAL | Age: 54
Discharge: HOME | End: 2024-02-05
Payer: COMMERCIAL

## 2024-02-05 DIAGNOSIS — S92.512A CLOSED DISPLACED FRACTURE OF PROXIMAL PHALANX OF LESSER TOE OF LEFT FOOT, INITIAL ENCOUNTER: ICD-10-CM

## 2024-02-05 PROCEDURE — 73630 X-RAY EXAM OF FOOT: CPT | Mod: LEFT SIDE | Performed by: STUDENT IN AN ORGANIZED HEALTH CARE EDUCATION/TRAINING PROGRAM

## 2024-02-05 PROCEDURE — 73630 X-RAY EXAM OF FOOT: CPT | Mod: LT

## 2024-02-06 DIAGNOSIS — E78.00 HYPERCHOLESTEROLEMIA: ICD-10-CM

## 2024-02-06 RX ORDER — ROSUVASTATIN CALCIUM 20 MG/1
20 TABLET, COATED ORAL DAILY
Qty: 90 TABLET | Refills: 0 | Status: SHIPPED | OUTPATIENT
Start: 2024-02-06 | End: 2024-03-07

## 2024-02-08 ENCOUNTER — OFFICE VISIT (OUTPATIENT)
Dept: PODIATRY | Facility: CLINIC | Age: 54
End: 2024-02-08
Payer: COMMERCIAL

## 2024-02-08 DIAGNOSIS — M79.672 LEFT FOOT PAIN: ICD-10-CM

## 2024-02-08 DIAGNOSIS — S92.512A CLOSED DISPLACED FRACTURE OF PROXIMAL PHALANX OF LESSER TOE OF LEFT FOOT, INITIAL ENCOUNTER: Primary | ICD-10-CM

## 2024-02-08 PROCEDURE — 3008F BODY MASS INDEX DOCD: CPT | Performed by: PODIATRIST

## 2024-02-08 PROCEDURE — 99214 OFFICE O/P EST MOD 30 MIN: CPT | Performed by: PODIATRIST

## 2024-02-08 NOTE — PROGRESS NOTES
Chief Complaint   Patient presents with    Foot/ankle Fracture     Patient is here today for a follow up on left foot fracture. New Xrays done   Patient is here for follow-up fracture of the fifth left digit.  Patient's says pain has improved.  Patient has been ambulating in the surgical shoe.  Patient is not buddy splinting the digit.  He had recent x-rays taken.    Physical exam  Patient is alert, oriented, no acute distress    Anatomical position of the fifth left digit  No pain on palpation of the proximal phalanx of the fifth left digit.  No pain on palpation of the fifth metatarsal head  No pain on palpation of the fifth MPJ left  No pain with ROM fifth left MPJ.  Edema of the fifth left digit remains  Ecchymosis has resolved  Gross motor is intact  +2/4 pedal pulses left foot     Assessment and plan  #1 Fracture of the proximal phalanx of the fifth left digit  Reviewed x-rays in detail with patient   X-rays as read by me are positive for bone callus and fracture healing noted, no change in position as compared to previous films.  Patient can start to transition out of the surgical shoe into a supportive athletic shoe as tolerated  Patient instructed to avoid any high impact activity for a total of 8 weeks from the onset of injury  Follow-up as needed

## 2024-02-26 DIAGNOSIS — K21.9 GASTROESOPHAGEAL REFLUX DISEASE WITHOUT ESOPHAGITIS: ICD-10-CM

## 2024-02-26 RX ORDER — OMEPRAZOLE 20 MG/1
20 CAPSULE, DELAYED RELEASE ORAL DAILY
Qty: 30 CAPSULE | Refills: 2 | Status: SHIPPED | OUTPATIENT
Start: 2024-02-26 | End: 2024-03-05

## 2024-02-29 ENCOUNTER — OFFICE VISIT (OUTPATIENT)
Dept: DERMATOLOGY | Facility: CLINIC | Age: 54
End: 2024-02-29
Payer: COMMERCIAL

## 2024-02-29 DIAGNOSIS — L21.9 SEBORRHEIC DERMATITIS: ICD-10-CM

## 2024-02-29 PROCEDURE — 3008F BODY MASS INDEX DOCD: CPT

## 2024-02-29 PROCEDURE — 99213 OFFICE O/P EST LOW 20 MIN: CPT

## 2024-02-29 RX ORDER — CLOBETASOL PROPIONATE 0.46 MG/ML
SOLUTION TOPICAL 2 TIMES DAILY
Qty: 50 ML | Refills: 3 | Status: SHIPPED | OUTPATIENT
Start: 2024-02-29 | End: 2024-03-30

## 2024-02-29 NOTE — PROGRESS NOTES
Subjective   HPI: Chavo Mariscal is a 53 y.o. male is here for evaluation and treatment of seb derm.   -no improvement  -still pruritic and bleeding on the right occiput area  -still flaking  -only using the fluocinonide solution once daily - doesn't like how it makes his hair feel     ROS: No other skin or systemic complaints other than what is documented elsewhere in the note.    ALLERGIES: Patient has no known allergies.    SOCIAL:  reports that he has an unknown smoking status. He has been exposed to tobacco smoke. He has never used smokeless tobacco. Alcohol use questions deferred to the physician. He reports that he does not use drugs.    Objective   Scalp  On the right and left occiput there are areas of inflammation and scabbing with slight scaling present.     Right is worse than left        Assessment/Plan   1. Seborrheic dermatitis  Scalp    Flaking is controlled on the rest of the scalp besides the inflamed areas on the right and left occiput.    I recommended a 4-week trial using clobetasol topical solution twice daily.  If this is not improved the inflammation of these areas then at the next appointment a punch biopsy will be performed.    Related Medications  ciclopirox 1 % shampoo  Wet hair and apply shampoo to scalp. Lather and leave on for 3 minutes. Rinse. Do this twice a week at night.    clobetasol (Temovate) 0.05 % external solution  Apply topically 2 times a day.         FOLLOW UP: 4 weeks    The patient was encouraged to contact me with any further questions or concerns.  Elana Rocha PA-C  2/29/2024

## 2024-03-05 DIAGNOSIS — K21.9 GASTROESOPHAGEAL REFLUX DISEASE WITHOUT ESOPHAGITIS: ICD-10-CM

## 2024-03-05 RX ORDER — OMEPRAZOLE 20 MG/1
20 CAPSULE, DELAYED RELEASE ORAL DAILY
Qty: 90 CAPSULE | Refills: 0 | Status: SHIPPED | OUTPATIENT
Start: 2024-03-05 | End: 2024-05-06 | Stop reason: SDUPTHER

## 2024-03-07 ENCOUNTER — OFFICE VISIT (OUTPATIENT)
Dept: OTOLARYNGOLOGY | Facility: CLINIC | Age: 54
End: 2024-03-07
Payer: COMMERCIAL

## 2024-03-07 ENCOUNTER — CLINICAL SUPPORT (OUTPATIENT)
Dept: AUDIOLOGY | Facility: CLINIC | Age: 54
End: 2024-03-07
Payer: COMMERCIAL

## 2024-03-07 VITALS
HEIGHT: 71 IN | OXYGEN SATURATION: 95 % | RESPIRATION RATE: 16 BRPM | WEIGHT: 315 LBS | DIASTOLIC BLOOD PRESSURE: 68 MMHG | HEART RATE: 55 BPM | BODY MASS INDEX: 44.1 KG/M2 | TEMPERATURE: 98.3 F | SYSTOLIC BLOOD PRESSURE: 122 MMHG

## 2024-03-07 DIAGNOSIS — E78.00 HYPERCHOLESTEROLEMIA: ICD-10-CM

## 2024-03-07 DIAGNOSIS — H90.A32 MIXED CONDUCTIVE AND SENSORINEURAL HEARING LOSS OF LEFT EAR WITH RESTRICTED HEARING OF RIGHT EAR: Primary | ICD-10-CM

## 2024-03-07 DIAGNOSIS — H90.A21 SENSORINEURAL HEARING LOSS (SNHL) OF RIGHT EAR WITH RESTRICTED HEARING OF LEFT EAR: ICD-10-CM

## 2024-03-07 DIAGNOSIS — H90.A32 MIXED CONDUCTIVE AND SENSORINEURAL HEARING LOSS OF LEFT EAR WITH RESTRICTED HEARING OF RIGHT EAR: ICD-10-CM

## 2024-03-07 DIAGNOSIS — H73.899: Primary | ICD-10-CM

## 2024-03-07 PROCEDURE — 3008F BODY MASS INDEX DOCD: CPT | Performed by: STUDENT IN AN ORGANIZED HEALTH CARE EDUCATION/TRAINING PROGRAM

## 2024-03-07 PROCEDURE — 92567 TYMPANOMETRY: CPT | Performed by: AUDIOLOGIST

## 2024-03-07 PROCEDURE — 99213 OFFICE O/P EST LOW 20 MIN: CPT | Performed by: STUDENT IN AN ORGANIZED HEALTH CARE EDUCATION/TRAINING PROGRAM

## 2024-03-07 PROCEDURE — 3074F SYST BP LT 130 MM HG: CPT | Performed by: STUDENT IN AN ORGANIZED HEALTH CARE EDUCATION/TRAINING PROGRAM

## 2024-03-07 PROCEDURE — 1036F TOBACCO NON-USER: CPT | Performed by: STUDENT IN AN ORGANIZED HEALTH CARE EDUCATION/TRAINING PROGRAM

## 2024-03-07 PROCEDURE — 92557 COMPREHENSIVE HEARING TEST: CPT | Performed by: AUDIOLOGIST

## 2024-03-07 PROCEDURE — 3078F DIAST BP <80 MM HG: CPT | Performed by: STUDENT IN AN ORGANIZED HEALTH CARE EDUCATION/TRAINING PROGRAM

## 2024-03-07 RX ORDER — ROSUVASTATIN CALCIUM 20 MG/1
20 TABLET, COATED ORAL DAILY
Qty: 90 TABLET | Refills: 3 | Status: SHIPPED | OUTPATIENT
Start: 2024-03-07

## 2024-03-07 SDOH — ECONOMIC STABILITY: FOOD INSECURITY: WITHIN THE PAST 12 MONTHS, YOU WORRIED THAT YOUR FOOD WOULD RUN OUT BEFORE YOU GOT MONEY TO BUY MORE.: NEVER TRUE

## 2024-03-07 SDOH — ECONOMIC STABILITY: FOOD INSECURITY: WITHIN THE PAST 12 MONTHS, THE FOOD YOU BOUGHT JUST DIDN'T LAST AND YOU DIDN'T HAVE MONEY TO GET MORE.: NEVER TRUE

## 2024-03-07 ASSESSMENT — COLUMBIA-SUICIDE SEVERITY RATING SCALE - C-SSRS
2. HAVE YOU ACTUALLY HAD ANY THOUGHTS OF KILLING YOURSELF?: NO
1. IN THE PAST MONTH, HAVE YOU WISHED YOU WERE DEAD OR WISHED YOU COULD GO TO SLEEP AND NOT WAKE UP?: NO
6. HAVE YOU EVER DONE ANYTHING, STARTED TO DO ANYTHING, OR PREPARED TO DO ANYTHING TO END YOUR LIFE?: NO

## 2024-03-07 ASSESSMENT — LIFESTYLE VARIABLES
SKIP TO QUESTIONS 9-10: 1
HOW OFTEN DO YOU HAVE SIX OR MORE DRINKS ON ONE OCCASION: NEVER
HOW OFTEN DO YOU HAVE A DRINK CONTAINING ALCOHOL: NEVER
AUDIT-C TOTAL SCORE: 0
HOW MANY STANDARD DRINKS CONTAINING ALCOHOL DO YOU HAVE ON A TYPICAL DAY: PATIENT DOES NOT DRINK

## 2024-03-07 ASSESSMENT — ENCOUNTER SYMPTOMS
DEPRESSION: 0
LOSS OF SENSATION IN FEET: 0
OCCASIONAL FEELINGS OF UNSTEADINESS: 0

## 2024-03-07 ASSESSMENT — PATIENT HEALTH QUESTIONNAIRE - PHQ9
1. LITTLE INTEREST OR PLEASURE IN DOING THINGS: NOT AT ALL
2. FEELING DOWN, DEPRESSED OR HOPELESS: NOT AT ALL
SUM OF ALL RESPONSES TO PHQ9 QUESTIONS 1 AND 2: 0

## 2024-03-07 ASSESSMENT — PAIN SCALES - GENERAL: PAINLEVEL: 0-NO PAIN

## 2024-03-07 NOTE — PROGRESS NOTES
"AUDIOLOGY ADULT AUDIOMETRIC EVALUATION      Name:  Chavo Mariscal  :  1970  Age:  53 y.o.  Date of Evaluation:  3/7/2024    HISTORY  Reason for visit:  hearing loss  Mr. Mariscal is seen 3/7/2024 at the request of Ray Morales M.D. for an evaluation of hearing.      Chief complaint:    Hearing loss    Hearing loss:  has not noticed a change since previous audiogram of 2023;  asymmetric, left worse; left hearing loss since age 16 years; right gradual   Tinnitus:   intermittent ringing tinnitus, bilateral; no change; not bothersome  Otitis Media: frequent ear drainage (thought to be wax); history of recurrent otitis media in childhood  Otologic surgical history:   left-sided tympanomastoidectomy around age 16 years; tubes in childhood  Dizziness/imbalance:  infrequent dizziness; feels a sensation of movement  Otalgia:  denies  Ear pressure/fullness:  denies  History of excessive noise exposure:  denies  Other: none    Hearing aid history: none          EVALUATION  Please find audiogram in \"Media\" tab (Document Type:  Audiology Report) or included at the bottom of this note.    RESULTS   Otoscopic Evaluation: minimal cerumen bilaterally     Immittance Measures (226 Hz probe tone):     Right ear:  Tympanometry is consistent with normal middle ear pressure and restricted tympanic membrane mobility.     Left ear: Tympanometry is consistent with an immobile middle ear system.      Ipsilateral acoustic reflexes (500-4000 Hz) are absent for the right ear for 500-4000 Hz and were not tested for the left ear due to flat tympanometry    Test technique:  standard behavioral technique via insert earphones.  Reliability is good.    Pure Tone Audiometry:    Right ear:  Hearing sensitivity is in the normal hearing to essentially mild hearing loss range.  Hearing loss is sensorineural   Left ear: Hearing sensitivity is in the moderate to profound hearing loss range.  Hearing loss is mixed    Note left air-bone gap for " 500-1000 Hz and possible air-bone gaps above 1000 Hz.    Note sensorineural asymmetry for 500 and 2176-1862 Hz (left worse than right)     Speech Audiometry:        Right Ear:  Speech Reception Threshold (SRT) was obtained at 20 dBHL                 Speech discrimination score was 100% in quiet when words were presented at 70 dBHL      Left Ear:  Speech Reception Threshold (SRT) was obtained at 75 dBHL                 Speech discrimination score was 68% in quiet when words were presented at 105 dBHL      IMPRESSIONS:  In comparison with previous audiogram of 9/7/2023, hearing is stable bilaterally.      Patient is expected to have communication difficulty in adverse listening environments.    Patient is expected to benefit from devices that provide amplification (e.g., hearing aids) and/or improve the desired sound signal over that of background noise.      RECOMMENDATIONS  Continue with otologic follow-up with Ray Morales M.D.   Reassess hearing as medically indicated and at least annually.     Consider a Hearing Aid Evaluation with an audiologist to discuss hearing technology (such as hearing aid or CROS aid) and services.    Continue with medical follow-up as indicated.       PATIENT EDUCATION  Discussed results and recommendations with patient.  Questions were addressed and the patient was encouraged to contact our department should concerns arise.       LAURA Mobley, [facility]  Licensed Audiologist

## 2024-03-08 NOTE — PROGRESS NOTES
History of present illness from 3/2/2023:  Chavo Mariscal is a 52-year-old man who presents with history of recurrent ear infections as a child with tubes who subsequently underwent left-sided tympanomastoidectomy. Since that surgery, he has had severe hearing loss in the left side. He denies any drainage or pain from either ear. His hearing remains good in the right ear.      The patient's current medications, active allergies and list of medical problems were reviewed in the EHR and confirmed electronically.  Physical Examination:  CONSTITUTIONAL: No acute distress  VOICE: No hoarseness or other abnormality  RESPIRATION: Breathing comfortably, no stridor  CV: No clubbing/cyanosis/edema in hands  EYES: EOM intact, sclera clear  NEURO: Alert and oriented times 3, Cranial nerves II-XII grossly intact and symmetric bilaterally  HEAD AND FACE: Symmetric facial features, no masses or lesions  SALIVARY GLANDS: Parotid and submandibular glands normal bilaterally  RIGHT EAR: Normal external ear and post auricular area, no visible lesions, external auditory canal patent, the tympanic membrane appears intact, but there is a significant attic retraction, which I cannot see the full extent of. There is no debris or drainage.  LEFT EAR: Normal external ear and post auricular area, no visible lesions, external auditory canal patent, tympanic membrane appears intact without evidence of an effusion, but there is a shallow attic retraction, which I think I can see the full extent of. There is no debris or drainage.  Jacob lateralizes to the right  Rinne: Air conduction greater than bone conduction on the right, bone conduction greater than air conduction on the left  NOSE: External nose midline, anterior rhinoscopy is normal with limited visualization to the anterior aspect of the interior turbinates, no bleeding or drainage, no lesions  ORAL CAVITY/OROPHARYNX/LIPS: Normal mucous membranes, normal floor of mouth/tongue/OP, no masses  or lesions  PHARYNGEAL WALLS: No masses or lesions  NECK/LYMPH: No LAD, no thyroid masses, trachea midline  SKIN: Neck and facial skin is without scar or injury  PSYCH: Alert and oriented with appropriate mood and affect     Diagnostic testing:  I reviewed the CT sinus from 7/29/2022, which demonstrated left-sided middle ear opacification with incus erosion, and evidence of prior mastoidectomy with mastoid opacification. The right middle ear and mastoid are mostly aerated with minimal scutal erosion.     I reviewed the audiogram from 6/29/2022, which demonstrated right low-frequency sensorineural hearing loss and moderate high-frequency sensorineural hearing loss with preserved middle frequencies, and left severe sloping to profound mixed hearing loss. Word recognition score was 100% on the right and 31% on the left.     I reviewed the audiogram from 9/7/2023 as well as from 3/7/2024, which reveal stable right and left hearing.  Word recognition score is actually improved from 31% in 2022 to 44% in 2023 to 68% in 2024 in the left ear.    Impression:  Chavo Mariscal is a 52-year-old man with bilateral attic retraction pockets and severe, nonserviceable left-sided hearing loss. He is not having drainage and is not collecting debris in either of the attic retraction pockets. The right retraction pocket I cannot fully see the depth, but the CT scan does not appear to show an extensive retraction pocket. Further he is not having debris or drainage. Further with it being his only hearing ear, I would prefer to observe. The retraction on the left appears to be shallow without debris or drainage. I would again recommend observation for this.     Recommendation:  I discussed his bilateral attic retraction pockets and that they could at some point progressed to cholesteatoma. I discussed that at this time I would recommend observation with follow-up in 6 months with audiogram. I also discussed hearing rehabilitation options,  including traditional amplification, which I do not think he would benefit from, but he could try, BiCROS hearing aids, bone-anchored hearing aid or osia or bone bridge, and cochlear implantation. After significant discussion, the patient would like to consider options for his hearing rehabilitation. He will follow-up in 1 year with an audiogram for surveillance of these retraction pockets.    Ray Morales MD

## 2024-03-28 ENCOUNTER — OFFICE VISIT (OUTPATIENT)
Dept: DERMATOLOGY | Facility: CLINIC | Age: 54
End: 2024-03-28
Payer: COMMERCIAL

## 2024-03-28 DIAGNOSIS — L21.9 SEBORRHEIC DERMATITIS: Primary | ICD-10-CM

## 2024-03-28 PROCEDURE — 3008F BODY MASS INDEX DOCD: CPT

## 2024-03-28 PROCEDURE — 99213 OFFICE O/P EST LOW 20 MIN: CPT

## 2024-04-01 NOTE — PROGRESS NOTES
Subjective   HPI: Chavo Mariscal is a 53 y.o. male who presents in office for evaluation and treatment of seborrheic dermatitis.  At last visit we switched his fluocinonide solution to clobetasol solution.  He has noticed significant improvement.  He is no longer having any bleeding or significant crusting.  He states that there is still a slightly problematic area on the right occiput    ROS: No other skin or systemic complaints other than what is documented elsewhere in the note.    ALLERGIES: Patient has no known allergies.    SOCIAL:  reports that he has never smoked. He has been exposed to tobacco smoke. He has never used smokeless tobacco. Alcohol use questions deferred to the physician. He reports that he does not use drugs.    Objective   Scalp  Significant improvement since last visit.  There is continues to be an active inflamed thickened plaque's area on the right occiput        Assessment/Plan   1. Seborrheic dermatitis  Scalp    Patient has shown significant improvement since last visit using the clobetasol solution.  Patient would like to continue current regimen and give it more time to work.  I am agreeable with this.    An IM Kenalog injection was offered to help with the inflammatory portion of the seborrheic dermatitis.  Patient declines at this time.    Related Medications  ciclopirox 1 % shampoo  Wet hair and apply shampoo to scalp. Lather and leave on for 3 minutes. Rinse. Do this twice a week at night.    clobetasol (Temovate) 0.05 % external solution  Apply topically 2 times a day.         FOLLOW UP: 6 weeks    The patient was encouraged to contact me with any further questions or concerns.  Elana Rocha PA-C  4/1/2024

## 2024-04-11 ENCOUNTER — OFFICE VISIT (OUTPATIENT)
Dept: OTOLARYNGOLOGY | Facility: CLINIC | Age: 54
End: 2024-04-11
Payer: COMMERCIAL

## 2024-04-11 VITALS — BODY MASS INDEX: 48.52 KG/M2 | TEMPERATURE: 97.3 F | HEIGHT: 71 IN

## 2024-04-11 DIAGNOSIS — J02.9 SORE THROAT: ICD-10-CM

## 2024-04-11 DIAGNOSIS — R13.10 DYSPHAGIA, UNSPECIFIED TYPE: ICD-10-CM

## 2024-04-11 DIAGNOSIS — J39.2 THROAT IRRITATION: Primary | ICD-10-CM

## 2024-04-11 PROCEDURE — 3008F BODY MASS INDEX DOCD: CPT | Performed by: NURSE PRACTITIONER

## 2024-04-11 PROCEDURE — 31575 DIAGNOSTIC LARYNGOSCOPY: CPT | Performed by: NURSE PRACTITIONER

## 2024-04-11 PROCEDURE — 99213 OFFICE O/P EST LOW 20 MIN: CPT | Performed by: NURSE PRACTITIONER

## 2024-04-11 PROCEDURE — 1036F TOBACCO NON-USER: CPT | Performed by: NURSE PRACTITIONER

## 2024-04-11 RX ORDER — CLOBETASOL PROPIONATE 0.46 MG/ML
SOLUTION TOPICAL 2 TIMES DAILY
COMMUNITY
Start: 2024-03-30 | End: 2024-05-06 | Stop reason: SDUPTHER

## 2024-04-11 ASSESSMENT — PAIN SCALES - GENERAL: PAINLEVEL: 0-NO PAIN

## 2024-04-11 ASSESSMENT — ENCOUNTER SYMPTOMS
OCCASIONAL FEELINGS OF UNSTEADINESS: 0
LOSS OF SENSATION IN FEET: 0
DEPRESSION: 0
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
## 2024-04-30 ENCOUNTER — OFFICE VISIT (OUTPATIENT)
Dept: DERMATOLOGY | Facility: CLINIC | Age: 54
End: 2024-04-30
Payer: COMMERCIAL

## 2024-04-30 DIAGNOSIS — L21.9 SEBORRHEIC DERMATITIS: ICD-10-CM

## 2024-05-06 ENCOUNTER — OFFICE VISIT (OUTPATIENT)
Dept: PRIMARY CARE | Facility: CLINIC | Age: 54
End: 2024-05-06
Payer: COMMERCIAL

## 2024-05-06 VITALS
HEART RATE: 54 BPM | OXYGEN SATURATION: 94 % | BODY MASS INDEX: 46.96 KG/M2 | SYSTOLIC BLOOD PRESSURE: 128 MMHG | WEIGHT: 315 LBS | DIASTOLIC BLOOD PRESSURE: 92 MMHG | TEMPERATURE: 96.6 F

## 2024-05-06 DIAGNOSIS — K21.9 GASTROESOPHAGEAL REFLUX DISEASE WITHOUT ESOPHAGITIS: ICD-10-CM

## 2024-05-06 DIAGNOSIS — E11.9 TYPE 2 DIABETES MELLITUS WITHOUT COMPLICATION, WITHOUT LONG-TERM CURRENT USE OF INSULIN (MULTI): ICD-10-CM

## 2024-05-06 DIAGNOSIS — R21 RASH: ICD-10-CM

## 2024-05-06 DIAGNOSIS — M54.50 LOW BACK PAIN, EPISODIC: ICD-10-CM

## 2024-05-06 DIAGNOSIS — J45.909 MILD ASTHMA WITHOUT COMPLICATION, UNSPECIFIED WHETHER PERSISTENT (HHS-HCC): ICD-10-CM

## 2024-05-06 DIAGNOSIS — M79.18 PAIN OF THIGH MUSCLE: Primary | ICD-10-CM

## 2024-05-06 DIAGNOSIS — Z12.5 SCREENING PSA (PROSTATE SPECIFIC ANTIGEN): ICD-10-CM

## 2024-05-06 PROCEDURE — 99214 OFFICE O/P EST MOD 30 MIN: CPT | Performed by: INTERNAL MEDICINE

## 2024-05-06 PROCEDURE — 1036F TOBACCO NON-USER: CPT | Performed by: INTERNAL MEDICINE

## 2024-05-06 PROCEDURE — 3080F DIAST BP >= 90 MM HG: CPT | Performed by: INTERNAL MEDICINE

## 2024-05-06 PROCEDURE — 3074F SYST BP LT 130 MM HG: CPT | Performed by: INTERNAL MEDICINE

## 2024-05-06 RX ORDER — OMEPRAZOLE 20 MG/1
20 CAPSULE, DELAYED RELEASE ORAL DAILY
Qty: 90 CAPSULE | Refills: 0 | Status: SHIPPED | OUTPATIENT
Start: 2024-05-06

## 2024-05-06 RX ORDER — CLOBETASOL PROPIONATE 0.46 MG/ML
SOLUTION TOPICAL 2 TIMES DAILY
Qty: 90 ML | Refills: 3 | Status: SHIPPED | OUTPATIENT
Start: 2024-05-06

## 2024-05-06 RX ORDER — AZELASTINE 1 MG/ML
1 SPRAY, METERED NASAL 2 TIMES DAILY
Qty: 30 ML | Refills: 3 | Status: SHIPPED | OUTPATIENT
Start: 2024-05-06

## 2024-05-06 ASSESSMENT — PATIENT HEALTH QUESTIONNAIRE - PHQ9
SUM OF ALL RESPONSES TO PHQ9 QUESTIONS 1 AND 2: 0
1. LITTLE INTEREST OR PLEASURE IN DOING THINGS: NOT AT ALL
2. FEELING DOWN, DEPRESSED OR HOPELESS: NOT AT ALL

## 2024-05-06 NOTE — PROGRESS NOTES
My nurse note reviewed. Patient is here for:  Leg Pain (Left leg tingling in legs/Shooting pain when trying to stand more muscular pain/And sometimes legs feel like they're going to give out./When laying down left side and stretching out leg. )   Pt is here for pain in both thigh muscles and low back pain . Sometimes in certain position he gets feeling of something shifting in lower back and then he gets pain for few seconds and then he is better. Also has fatigue and soreness feeling in both upper leg area . No numbness, tingling   He is watching diet to lose some weight .   No fall or injury .   OBJECTIVE :  BP (!) 128/92   Pulse 54   Temp 35.9 °C (96.6 °F)   Wt (!) 151 kg (332 lb)   SpO2 94%   BMI 46.96 kg/m²   Morbidly obese  Lower back - No spine tenderness. Able to sit and stand up without support.  DTR +/_  both knees. Straight leg raising ok upto 80 degree bilaterally . No gross motor weakness noted in lower extremities. Walking on toes and heel ok. No skin lesions or vesicles noted in lower back area.pinprick slightly low on lat aspect of both legs as compared to medial aspect of legs      Assessment:  1. Pain of thigh muscle ? Due to ruy med vs due to lower back etiology . Discussed with pt Aldolase    Sedimentation Rate    XR lumbar spine 2-3 views    Albumin , Urine Random      2. Gastroesophageal reflux disease without esophagitis  omeprazole (PriLOSEC) 20 mg DR capsule      3. Type 2 diabetes mellitus without complication, without long-term current use of insulin (Multi)  CBC and Auto Differential    Basic Metabolic Panel    Hepatic Function Panel    Hemoglobin A1C    Lipid Panel Non-Fasting    Thyroid Stimulating Hormone    Aldolase    Sedimentation Rate    XR lumbar spine 2-3 views    Albumin , Urine Random      4. Mild asthma without complication, unspecified whether persistent (HHS-HCC)  azelastine (Astelin) 137 mcg (0.1 %) nasal spray      5. Low back pain, episodic  Xray ordered      6.  Screening PSA (prostate specific antigen)  Prostate Specific Antigen      7. Rash  clobetasol (Temovate) 0.05 % external solution        Plan  Blood tests ordered to be done about a week before his upcoming appointment in few weeks   Advised to hold off on Rosuvastatin until next visit   Xray lumbar spine ordered  Discussed about steroids for lower back issue. He would like to hold off on it at present .   F/U as advised

## 2024-05-06 NOTE — PATIENT INSTRUCTIONS
Plan  Blood tests ordered to be done about a week before his upcoming appointment in few weeks   Advised to hold off on Rosuvastatin until next visit   Xray lumbar spine ordered  Discussed about steroids for lower back issue. He would like to hold off on it at present .   F/U as advised

## 2024-05-16 ENCOUNTER — LAB (OUTPATIENT)
Dept: LAB | Facility: LAB | Age: 54
End: 2024-05-16
Payer: COMMERCIAL

## 2024-05-16 ENCOUNTER — HOSPITAL ENCOUNTER (OUTPATIENT)
Dept: RADIOLOGY | Facility: HOSPITAL | Age: 54
Discharge: HOME | End: 2024-05-16
Payer: COMMERCIAL

## 2024-05-16 DIAGNOSIS — E11.9 TYPE 2 DIABETES MELLITUS WITHOUT COMPLICATION, WITHOUT LONG-TERM CURRENT USE OF INSULIN (MULTI): ICD-10-CM

## 2024-05-16 DIAGNOSIS — M79.18 PAIN OF THIGH MUSCLE: ICD-10-CM

## 2024-05-16 DIAGNOSIS — Z12.5 SCREENING PSA (PROSTATE SPECIFIC ANTIGEN): ICD-10-CM

## 2024-05-16 LAB
ALBUMIN SERPL BCP-MCNC: 3.9 G/DL (ref 3.4–5)
ALP SERPL-CCNC: 112 U/L (ref 33–120)
ALT SERPL W P-5'-P-CCNC: 10 U/L (ref 10–52)
ANION GAP SERPL CALC-SCNC: 17 MMOL/L (ref 10–20)
AST SERPL W P-5'-P-CCNC: 15 U/L (ref 9–39)
BASOPHILS # BLD AUTO: 0.14 X10*3/UL (ref 0–0.1)
BASOPHILS NFR BLD AUTO: 1.1 %
BILIRUB DIRECT SERPL-MCNC: 0.1 MG/DL (ref 0–0.3)
BILIRUB SERPL-MCNC: 0.3 MG/DL (ref 0–1.2)
BUN SERPL-MCNC: 22 MG/DL (ref 6–23)
CALCIUM SERPL-MCNC: 8.9 MG/DL (ref 8.6–10.6)
CHLORIDE SERPL-SCNC: 105 MMOL/L (ref 98–107)
CHOLEST SERPL-MCNC: 168 MG/DL (ref 0–199)
CHOLESTEROL/HDL RATIO: 4.6
CO2 SERPL-SCNC: 24 MMOL/L (ref 21–32)
CREAT SERPL-MCNC: 0.78 MG/DL (ref 0.5–1.3)
EGFRCR SERPLBLD CKD-EPI 2021: >90 ML/MIN/1.73M*2
EOSINOPHIL # BLD AUTO: 0.49 X10*3/UL (ref 0–0.7)
EOSINOPHIL NFR BLD AUTO: 4 %
ERYTHROCYTE [DISTWIDTH] IN BLOOD BY AUTOMATED COUNT: 13.8 % (ref 11.5–14.5)
ERYTHROCYTE [SEDIMENTATION RATE] IN BLOOD BY WESTERGREN METHOD: 36 MM/H (ref 0–20)
EST. AVERAGE GLUCOSE BLD GHB EST-MCNC: 163 MG/DL
GLUCOSE SERPL-MCNC: 110 MG/DL (ref 74–99)
HBA1C MFR BLD: 7.3 %
HCT VFR BLD AUTO: 40.3 % (ref 41–52)
HDLC SERPL-MCNC: 36.8 MG/DL
HGB BLD-MCNC: 13.1 G/DL (ref 13.5–17.5)
IMM GRANULOCYTES # BLD AUTO: 0.03 X10*3/UL (ref 0–0.7)
IMM GRANULOCYTES NFR BLD AUTO: 0.2 % (ref 0–0.9)
LYMPHOCYTES # BLD AUTO: 3.37 X10*3/UL (ref 1.2–4.8)
LYMPHOCYTES NFR BLD AUTO: 27.3 %
MCH RBC QN AUTO: 26.9 PG (ref 26–34)
MCHC RBC AUTO-ENTMCNC: 32.5 G/DL (ref 32–36)
MCV RBC AUTO: 83 FL (ref 80–100)
MONOCYTES # BLD AUTO: 0.87 X10*3/UL (ref 0.1–1)
MONOCYTES NFR BLD AUTO: 7 %
NEUTROPHILS # BLD AUTO: 7.46 X10*3/UL (ref 1.2–7.7)
NEUTROPHILS NFR BLD AUTO: 60.4 %
NON-HDL CHOLESTEROL: 131 MG/DL (ref 0–149)
NRBC BLD-RTO: 0 /100 WBCS (ref 0–0)
PLATELET # BLD AUTO: 244 X10*3/UL (ref 150–450)
POTASSIUM SERPL-SCNC: 4.2 MMOL/L (ref 3.5–5.3)
PROT SERPL-MCNC: 7.2 G/DL (ref 6.4–8.2)
PSA SERPL-MCNC: 0.41 NG/ML
RBC # BLD AUTO: 4.87 X10*6/UL (ref 4.5–5.9)
SODIUM SERPL-SCNC: 142 MMOL/L (ref 136–145)
TSH SERPL-ACNC: 1.85 MIU/L (ref 0.44–3.98)
WBC # BLD AUTO: 12.4 X10*3/UL (ref 4.4–11.3)

## 2024-05-16 PROCEDURE — 82248 BILIRUBIN DIRECT: CPT

## 2024-05-16 PROCEDURE — 36415 COLL VENOUS BLD VENIPUNCTURE: CPT

## 2024-05-16 PROCEDURE — 82465 ASSAY BLD/SERUM CHOLESTEROL: CPT

## 2024-05-16 PROCEDURE — 85652 RBC SED RATE AUTOMATED: CPT

## 2024-05-16 PROCEDURE — 82085 ASSAY OF ALDOLASE: CPT

## 2024-05-16 PROCEDURE — 72110 X-RAY EXAM L-2 SPINE 4/>VWS: CPT | Performed by: STUDENT IN AN ORGANIZED HEALTH CARE EDUCATION/TRAINING PROGRAM

## 2024-05-16 PROCEDURE — 83718 ASSAY OF LIPOPROTEIN: CPT

## 2024-05-16 PROCEDURE — 83036 HEMOGLOBIN GLYCOSYLATED A1C: CPT

## 2024-05-16 PROCEDURE — 85025 COMPLETE CBC W/AUTO DIFF WBC: CPT

## 2024-05-16 PROCEDURE — 84153 ASSAY OF PSA TOTAL: CPT

## 2024-05-16 PROCEDURE — 84443 ASSAY THYROID STIM HORMONE: CPT

## 2024-05-16 PROCEDURE — 72110 X-RAY EXAM L-2 SPINE 4/>VWS: CPT

## 2024-05-16 PROCEDURE — 80053 COMPREHEN METABOLIC PANEL: CPT

## 2024-05-18 LAB
ALDOLASE SERPL-CCNC: 4.5 U/L (ref 1.2–7.6)
CREAT UR-MCNC: 147.7 MG/DL (ref 20–370)
MICROALBUMIN UR-MCNC: <7 MG/L
MICROALBUMIN/CREAT UR: NORMAL MG/G{CREAT}

## 2024-05-18 PROCEDURE — 82570 ASSAY OF URINE CREATININE: CPT

## 2024-05-18 PROCEDURE — 82043 UR ALBUMIN QUANTITATIVE: CPT

## 2024-05-20 NOTE — PROGRESS NOTES
Subjective   HPI: Chavo Mariscal is a 53 y.o. male who presents in office for evaluation and treatment of seb derm.     ROS: No other skin or systemic complaints other than what is documented elsewhere in the note.    ALLERGIES: Patient has no known allergies.    SOCIAL:  reports that he has never smoked. He has been exposed to tobacco smoke. He has never used smokeless tobacco. Alcohol use questions deferred to the physician. He reports that he does not use drugs.    Objective   Scalp  There continues to be an active inflamed thickened plaque's area on the right occiput           Assessment/Plan   1. Seborrheic dermatitis  Scalp    Discussed with patient that clinically there continues to be a thickened and inflamed plaque on the right occiput region.  As a last ditch effort we are trying Zoryve foam.  If this does not work within the next 6 weeks then a punch biopsy will be performed as my concern for scalp psoriasis has increased.  A biopsy was not performed today as the patient is getting ready to leave for a cruise.  We also discussed doing an IM Kenalog injection to help calm the inflammation however after discussing possible side effects we both agreed not to pursue that treatment route patient is agreeable to this plan.    Related Medications  ciclopirox 1 % shampoo  Wet hair and apply shampoo to scalp. Lather and leave on for 3 minutes. Rinse. Do this twice a week at night.    roflumilast (Zoryve) 0.3 % foam  Apply 1 Application topically once daily.         FOLLOW UP: 6 weeks    The patient was encouraged to contact me with any further questions or concerns.  Elana Rocha PA-C  5/31/2024

## 2024-05-21 ENCOUNTER — OFFICE VISIT (OUTPATIENT)
Dept: DERMATOLOGY | Facility: CLINIC | Age: 54
End: 2024-05-21
Payer: COMMERCIAL

## 2024-05-21 DIAGNOSIS — R00.0 TACHYCARDIA: ICD-10-CM

## 2024-05-21 DIAGNOSIS — L21.9 SEBORRHEIC DERMATITIS: ICD-10-CM

## 2024-05-21 PROCEDURE — 3062F POS MACROALBUMINURIA REV: CPT

## 2024-05-21 PROCEDURE — 99213 OFFICE O/P EST LOW 20 MIN: CPT

## 2024-05-21 PROCEDURE — 3051F HG A1C>EQUAL 7.0%<8.0%: CPT

## 2024-05-21 RX ORDER — ROFLUMILAST 3 MG/G
1 AEROSOL, FOAM TOPICAL DAILY
Qty: 60 G | Refills: 2 | Status: SHIPPED | OUTPATIENT
Start: 2024-05-21

## 2024-05-22 ENCOUNTER — APPOINTMENT (OUTPATIENT)
Dept: PRIMARY CARE | Facility: CLINIC | Age: 54
End: 2024-05-22
Payer: COMMERCIAL

## 2024-05-22 RX ORDER — ATENOLOL 25 MG/1
25 TABLET ORAL DAILY
Qty: 30 TABLET | Refills: 0 | Status: SHIPPED | OUTPATIENT
Start: 2024-05-22 | End: 2024-05-26

## 2024-05-23 DIAGNOSIS — R00.0 TACHYCARDIA: ICD-10-CM

## 2024-05-26 RX ORDER — ATENOLOL 25 MG/1
25 TABLET ORAL DAILY
Qty: 30 TABLET | Refills: 0 | Status: SHIPPED | OUTPATIENT
Start: 2024-05-26

## 2024-06-05 ENCOUNTER — OFFICE VISIT (OUTPATIENT)
Dept: PRIMARY CARE | Facility: CLINIC | Age: 54
End: 2024-06-05
Payer: COMMERCIAL

## 2024-06-05 VITALS
TEMPERATURE: 95.7 F | OXYGEN SATURATION: 95 % | SYSTOLIC BLOOD PRESSURE: 132 MMHG | DIASTOLIC BLOOD PRESSURE: 86 MMHG | BODY MASS INDEX: 47.67 KG/M2 | HEART RATE: 47 BPM | WEIGHT: 315 LBS

## 2024-06-05 DIAGNOSIS — E11.9 TYPE 2 DIABETES MELLITUS WITHOUT COMPLICATION, WITHOUT LONG-TERM CURRENT USE OF INSULIN (MULTI): ICD-10-CM

## 2024-06-05 DIAGNOSIS — R35.0 URINARY FREQUENCY: ICD-10-CM

## 2024-06-05 DIAGNOSIS — J45.909 MILD ASTHMA WITHOUT COMPLICATION, UNSPECIFIED WHETHER PERSISTENT (HHS-HCC): ICD-10-CM

## 2024-06-05 DIAGNOSIS — G47.33 OBSTRUCTIVE SLEEP APNEA: ICD-10-CM

## 2024-06-05 DIAGNOSIS — E66.01 OBESITY, MORBID, BMI 40.0-49.9 (MULTI): ICD-10-CM

## 2024-06-05 DIAGNOSIS — M79.18 PAIN OF THIGH MUSCLE: ICD-10-CM

## 2024-06-05 DIAGNOSIS — Z00.00 ANNUAL PHYSICAL EXAM: Primary | ICD-10-CM

## 2024-06-05 DIAGNOSIS — K21.9 GERD WITHOUT ESOPHAGITIS: ICD-10-CM

## 2024-06-05 PROCEDURE — 3062F POS MACROALBUMINURIA REV: CPT | Performed by: INTERNAL MEDICINE

## 2024-06-05 PROCEDURE — 1036F TOBACCO NON-USER: CPT | Performed by: INTERNAL MEDICINE

## 2024-06-05 PROCEDURE — 99213 OFFICE O/P EST LOW 20 MIN: CPT | Performed by: INTERNAL MEDICINE

## 2024-06-05 PROCEDURE — 3075F SYST BP GE 130 - 139MM HG: CPT | Performed by: INTERNAL MEDICINE

## 2024-06-05 PROCEDURE — 99396 PREV VISIT EST AGE 40-64: CPT | Performed by: INTERNAL MEDICINE

## 2024-06-05 PROCEDURE — 90750 HZV VACC RECOMBINANT IM: CPT | Performed by: INTERNAL MEDICINE

## 2024-06-05 PROCEDURE — 3051F HG A1C>EQUAL 7.0%<8.0%: CPT | Performed by: INTERNAL MEDICINE

## 2024-06-05 PROCEDURE — 90471 IMMUNIZATION ADMIN: CPT | Performed by: INTERNAL MEDICINE

## 2024-06-05 PROCEDURE — 3079F DIAST BP 80-89 MM HG: CPT | Performed by: INTERNAL MEDICINE

## 2024-06-05 ASSESSMENT — PATIENT HEALTH QUESTIONNAIRE - PHQ9
2. FEELING DOWN, DEPRESSED OR HOPELESS: NOT AT ALL
1. LITTLE INTEREST OR PLEASURE IN DOING THINGS: NOT AT ALL
SUM OF ALL RESPONSES TO PHQ9 QUESTIONS 1 AND 2: 0

## 2024-06-05 NOTE — PATIENT INSTRUCTIONS
Plan  Annual physical done.  Recent lab results were discussed with patient. Questions related to it answered. Patient felt satisfied with it.  Urology consult for urinary symptoms  Shingrix 2nd dose today   F/U 6 months or as needed

## 2024-06-05 NOTE — PROGRESS NOTES
"My nurse note reviewed. Patient is here for:  Annual Exam (Urinary frequency and difficulties pushing. No blood in urine//2nd round shingles)       Patient denies any shortness of breath, PND, orthopnea, chest pain , palpitation, syncope or edema in legs  patient denies any abdominal pain, tenderness, nausea, vomiting, change in bowel habits or blood in stool.  Patient denies any weakness in extremities.. Denies any headache, visual symptoms , speech problems or  tremors . No TIA or stroke like symptoms..    He was seen for leg pain, weakness few weeks ago . Xray was done. Results discussed with him. Questions answered. He is not having pain for few weeks now.     Non smoker, occ alcohol drinking     Recent lab results were discussed with patient. Questions related to it answered. Patient felt satisfied with it.  Lab Results   Component Value Date    HGBA1C 7.3 (H) 05/16/2024     Lab Results   Component Value Date    CHOL 168 05/16/2024    CHOL 129 10/21/2023    CHOL 179 07/29/2021     Lab Results   Component Value Date    HDL 36.8 05/16/2024    HDL 41.6 10/21/2023    HDL 38.6 (A) 07/29/2021     Lab Results   Component Value Date    LDLCALC 66 10/21/2023     Lab Results   Component Value Date    TRIG 106 10/21/2023     No components found for: \"CHOLHDL\"    Lab Results   Component Value Date    PSA 0.41 05/16/2024    PSA 0.44 07/29/2021    PSA 0.36 03/19/2019     Pt is c/o frequent urination , sometimes difficult to initiate urine. No blood in urine    No fever, chills,   Wants 2nd shingle shot .   OBJECTIVE :  /86   Pulse (!) 47   Temp 35.4 °C (95.7 °F)   Wt (!) 153 kg (337 lb)   SpO2 95%   BMI 47.67 kg/m²   Morbidly obese  Vitals noted   Not in acute distress  Conj Pink, No icterus  Neck:No Cervical LN enlargement, No Thyroid enlargement No carotid bruit  Lungs: good air entry bilaterally, no rales or rhonchi  CVS: S1 S2 + , no S3. No loud heart murmur heard.   Abdomen: Soft, non tender , BS +. no " organomegaly , no CVA tenderness  MSK: No spine tenderness or muscle tenderness noted on gross examination  CNS: Pt is alert, moving all 4 extremities. no motor weakness or abnormal movements noted on gross examination.  Extremities: No edema, No calf tenderness, Jono's sign negative.    Assessment:  1. Annual physical exam -done. Recent results discussed    2. Type 2 diabetes mellitus without complication, without long-term current use of insulin (Multi) -uncontrolled. Discussed goal.    3. Mild asthma without complication, unspecified whether persistent (HHS-HCC) -hx of. stable   4. GERD without esophagitis -stable on prilosec   5. Obesity, morbid, BMI 40.0-49.9 (Multi)    6. Obstructive sleep apnea -hx of. On c pap   7. Pain of thigh muscle -I'mproved for few weeks as per him.    8 frequency of urine - urology consult  Plan  Annual physical done.  Recent lab results were discussed with patient. Questions related to it answered. Patient felt satisfied with it.  Urology consult for urinary symptoms  Shingrix 2nd dose today   F/U 6 months or as needed

## 2024-06-10 ENCOUNTER — APPOINTMENT (OUTPATIENT)
Dept: PRIMARY CARE | Facility: CLINIC | Age: 54
End: 2024-06-10
Payer: COMMERCIAL

## 2024-06-18 DIAGNOSIS — J02.9 SORE THROAT: ICD-10-CM

## 2024-06-18 DIAGNOSIS — R13.10 DYSPHAGIA, UNSPECIFIED TYPE: Primary | ICD-10-CM

## 2024-06-24 ENCOUNTER — OFFICE VISIT (OUTPATIENT)
Dept: DERMATOLOGY | Facility: CLINIC | Age: 54
End: 2024-06-24
Payer: COMMERCIAL

## 2024-06-24 DIAGNOSIS — L21.9 SEBORRHEIC DERMATITIS: ICD-10-CM

## 2024-06-24 DIAGNOSIS — R00.0 TACHYCARDIA: ICD-10-CM

## 2024-06-24 RX ORDER — ATENOLOL 25 MG/1
25 TABLET ORAL DAILY
Qty: 30 TABLET | Refills: 0 | Status: SHIPPED | OUTPATIENT
Start: 2024-06-24

## 2024-07-03 ENCOUNTER — APPOINTMENT (OUTPATIENT)
Dept: RADIOLOGY | Facility: HOSPITAL | Age: 54
End: 2024-07-03
Payer: COMMERCIAL

## 2024-07-08 ENCOUNTER — HOSPITAL ENCOUNTER (OUTPATIENT)
Dept: RADIOLOGY | Facility: HOSPITAL | Age: 54
Discharge: HOME | End: 2024-07-08
Payer: COMMERCIAL

## 2024-07-08 DIAGNOSIS — J02.9 SORE THROAT: ICD-10-CM

## 2024-07-08 DIAGNOSIS — R13.10 DYSPHAGIA, UNSPECIFIED TYPE: ICD-10-CM

## 2024-07-08 PROCEDURE — 74230 X-RAY XM SWLNG FUNCJ C+: CPT | Performed by: RADIOLOGY

## 2024-07-08 PROCEDURE — 2500000005 HC RX 250 GENERAL PHARMACY W/O HCPCS: Performed by: NURSE PRACTITIONER

## 2024-07-08 PROCEDURE — 74220 X-RAY XM ESOPHAGUS 1CNTRST: CPT

## 2024-07-08 PROCEDURE — 74230 X-RAY XM SWLNG FUNCJ C+: CPT

## 2024-07-08 PROCEDURE — 92611 MOTION FLUOROSCOPY/SWALLOW: CPT | Mod: GN | Performed by: SPEECH-LANGUAGE PATHOLOGIST

## 2024-07-08 NOTE — PROCEDURES
"Speech-Language Pathology      Modified Barium Swallow Study     Patient Name: Chavo Mariscal  MRN: [id number]  : 1970  Today's Date: 24        MBSS completed. Informed verbal consent obtained prior to completion of exam.   Trials of thin (5mL, 20 mL hold/swallow, + 40mL continuous cup sips), nectar thick (20mL hold/swallow, + continuous cup sips); puree, soft-solids, barium tablet + water, and regular solids given. Lateral and a-p views obtained; unable to place marker on neck d/t beard.     Recommendations:  Oropharyngeal swallow is intact for regular textures, thin liquids.   General aspiraiton and reflux precautions:  Fully upright for meals/meds  Remain upright after eating/drinking.      Assessment/Impression:    Full detailed SLP/Radiologist Modified Barium Swallow study report can be found under Chart Review tab, Imaging tab and  titled \"FL Modified Barium Swallow Study\"      Mechanics of the swallow summary:  Oral phase: intact  Pharyngeal phase: intact  Esophageal phase: ?slow moving pudding barium noted on esophageal sweep; otherwise nectar thick liquids and barium tablet move through esophagus without retention.     SLP impressions with severity rating:   -Intact oral and pharyngeal phases of the swallow.   -Bolus moves safely and efficiently from oral cavity, through pharynx, empties into the esophagus without obstruction or airway compromise.   -Swallow is timely.   -No significant pharyngeal residue after the swallow.   -Bilateral flow of bolus through lateral channels on a-p view.   -No laryngeal penetration.   -No aspiration.   -?Slower moving barium pudding noted mid-esophagus on esophageal sweep; pt is also having esophagram today.     Rosenbek Scale  Thin Liquids (MBSS)   Rosenbek's Penetration Aspiration Scale, Thin Liquids (MBSS):  [1. NO ASPIRATION & NO PENETRATION - no aspiration, contrast does not enter airway]  Nectar Thick Liquids (MBSS)   Rosenbek's Penetration Aspiration " Scale, Nectar thick liquids (MBSS):  [1. NO ASPIRATION & NO PENETRATION - no aspiration, contrast does not enter airway]  Soft solids (MBSS)   Rosenbek's Penetration Aspiration Scale, soft (MBSS):  [1. NO ASPIRATION & NO PENETRATION - no aspiration, contrast does not enter airway]  Purees (MBSS)   Rosenbek's Penetration Aspiration Scale, Purees (MBSS):  [1. NO ASPIRATION & NO PENETRATION - no aspiration, contrast does not enter airway]  Solids (MBSS)   Rosenbek's Penetration Aspiration Scale, Solids (MBSS):  [1. NO ASPIRATION & NO PENETRATION - no aspiration, contrast does not enter airway]    Follow-up speech therapy recommended: no  Education provided: Pt verbally provided with the results and recommendations of this instrumental swallow study. Video images reviewed during the session with patient, so that he might have a better understanding of the flow of the bolus from oral cavity, through pharynx, and emptying into esophagus.     Reason for referral: Pt c/o longstanding and worsening dysphagia. He reports it takes 3-4 swallows to get food down. He is not losing weight, no recurrent pna. Pt follows with ENT office, SUZANNE Santiago, referral made for MBSS and esophagram.     Patient hx: History includes Covid+Jan 2024. GERD, nasal obstruction, chronic rhinitis, chronic sinusitis, asthma, SOPHIE, pt broke nose several times when he was 1 yrs old.     Respiratory status: Room air.   Previous diet: Eats all foods, no restrictions by texture.

## 2024-07-09 DIAGNOSIS — K21.9 GERD WITHOUT ESOPHAGITIS: ICD-10-CM

## 2024-07-09 DIAGNOSIS — R13.10 DYSPHAGIA, UNSPECIFIED TYPE: Primary | ICD-10-CM

## 2024-07-18 ENCOUNTER — OFFICE VISIT (OUTPATIENT)
Dept: UROLOGY | Facility: HOSPITAL | Age: 54
End: 2024-07-18
Payer: COMMERCIAL

## 2024-07-18 DIAGNOSIS — R35.0 URINARY FREQUENCY: Primary | ICD-10-CM

## 2024-07-18 PROCEDURE — 99204 OFFICE O/P NEW MOD 45 MIN: CPT | Performed by: STUDENT IN AN ORGANIZED HEALTH CARE EDUCATION/TRAINING PROGRAM

## 2024-07-18 PROCEDURE — 3051F HG A1C>EQUAL 7.0%<8.0%: CPT | Performed by: STUDENT IN AN ORGANIZED HEALTH CARE EDUCATION/TRAINING PROGRAM

## 2024-07-18 PROCEDURE — 3062F POS MACROALBUMINURIA REV: CPT | Performed by: STUDENT IN AN ORGANIZED HEALTH CARE EDUCATION/TRAINING PROGRAM

## 2024-07-18 PROCEDURE — 99214 OFFICE O/P EST MOD 30 MIN: CPT | Performed by: STUDENT IN AN ORGANIZED HEALTH CARE EDUCATION/TRAINING PROGRAM

## 2024-07-18 RX ORDER — TADALAFIL 5 MG/1
5 TABLET ORAL DAILY
Qty: 30 TABLET | Refills: 3 | Status: SHIPPED | OUTPATIENT
Start: 2024-07-18 | End: 2024-11-15

## 2024-07-18 NOTE — PROGRESS NOTES
Subjective   Patient ID: Chavo Mariscal is a 53 y.o. male    HPI  53 y.o. male who presenting with urinary frequency and incomplete bladder emptying. He reports needing to urinate frequently, approximately every half hour after drinking water. He also experiences occasional burning during urination. The patient has a history of using a CPAP machine, which has reduced nocturnal urination. Additionally, he reports issues with erectile dysfunction.    The most recent PSA, conducted on 5/16/2024, revealed:  0.41 ng/ml       Review of Systems    All systems were reviewed. Anything negative was noted in the HPI.    Objective   Physical Exam    General: Well developed, well nourished, alert and cooperative, appears in no acute distress   Eyes: Non-injected conjunctiva, sclera clear, no proptosis   Cardiac: Extremities are warm and well perfused. No edema, cyanosis or pallor   Lungs: Breathing is easy, non-labored. Speaking in clear and complete sentences. Normal diaphragmatic movement   MSK: Ambulatory with steady gait, unassisted   Neuro: Alert and oriented to person, place, and time   Psych: Demonstrates good judgment and reason, without hallucinations, abnormal affect or abnormal behaviors   Skin: No obvious lesions, no rashes       No CVA tenderness bilaterally   No suprapubic pain or discomfort       Past Medical History:   Diagnosis Date    Mixed conductive and sensorineural hearing loss, unspecified     Mixed hearing loss    Morbid (severe) obesity due to excess calories (Multi)     Severe obesity (BMI 35.0-35.9 with comorbidity)    Obstructive sleep apnea (adult) (pediatric)     Obstructive sleep apnea, adult    Periodic limb movement disorder     Periodic limb movement disorder    Personal history of other diseases of the circulatory system     History of cardiac murmur    Personal history of other diseases of the digestive system     History of gastritis    Personal history of other diseases of the  musculoskeletal system and connective tissue     History of arthritis    Personal history of other diseases of the respiratory system     History of asthma    Type 2 diabetes mellitus without complications (Multi) 07/20/2021    Controlled diabetes mellitus         Past Surgical History:   Procedure Laterality Date    HERNIA REPAIR  08/22/2017    Hernia Repair    INNER EAR SURGERY  08/22/2017    Inner Ear Surgery    OTHER SURGICAL HISTORY  12/20/2019    Tonsillectomy    OTHER SURGICAL HISTORY  01/16/2019    Colonoscopy    OTHER SURGICAL HISTORY  01/16/2019    Esophagogastroduodenoscopy           Assessment/Plan   Benign Prostatic Hyperplasia (BPH), Erectile Dysfunction (ED)    53 y.o. male who presents for the above condition, Today, we had a very long and extensive discussion with the patient regarding the pathophysiology, differential diagnosis, risk factor, associated condition, diagnostic work-up and management of BPH and lower urinary tract symptoms and acute urinary retention. I discussed with the patient the need to check his PSA to assess his prostate cancer risk. We discussed at length the mechanism of action, risk, benefit, adverse events and side effect of alpha-blocker in the form of tamsulosin 0.4 mg p.o nightly. We discussed in particular the risk of hypotension, lightheadedness, dizziness, and the risk of fall and bone fracture. Also discussed retrograde ejaculation of the side effects of the medication. We had another discussion with the patient regarding lifestyle modifications including low fluid intake after 5 PM, timed voiding every 2 hours, and decrease caffeine intake. I discussed with the patient that in case he had an elevated PSA, we will proceed do an MRI of the prostate.      We had a very long and extensive discussion with the patient regarding the pathophysiology, differential diagnosis, risk factor, and management of ED. We discussed at length mechanism of action, risk, benefit,  potential complication, adverse events of PDE 5 inhibitors in the form of Tadalafil 5 mg/day and 10 mg/ as needed as needed. Instructed the patient to stop the medication in case of any side effects.    Plan:  - Tadalafil 5 mg/day and 10 mg/PRN  - Renal US   - Cystoscopy   - Follow up in 1 month      7/18/2024    Savi Attestation  By signing my name below, ISaray Scribe   attest that this documentation has been prepared under the direction and in the presence of Dr. Sterling Burnham

## 2024-07-22 DIAGNOSIS — R00.0 TACHYCARDIA: ICD-10-CM

## 2024-07-22 RX ORDER — ATENOLOL 25 MG/1
25 TABLET ORAL DAILY
Qty: 90 TABLET | Refills: 3 | Status: SHIPPED | OUTPATIENT
Start: 2024-07-22

## 2024-07-30 ENCOUNTER — OFFICE VISIT (OUTPATIENT)
Dept: GASTROENTEROLOGY | Facility: CLINIC | Age: 54
End: 2024-07-30
Payer: COMMERCIAL

## 2024-07-30 VITALS
BODY MASS INDEX: 44.1 KG/M2 | TEMPERATURE: 97.5 F | HEIGHT: 71 IN | WEIGHT: 315 LBS | HEART RATE: 55 BPM | DIASTOLIC BLOOD PRESSURE: 67 MMHG | SYSTOLIC BLOOD PRESSURE: 124 MMHG

## 2024-07-30 DIAGNOSIS — K21.9 GERD WITHOUT ESOPHAGITIS: ICD-10-CM

## 2024-07-30 DIAGNOSIS — R13.10 DYSPHAGIA, UNSPECIFIED TYPE: ICD-10-CM

## 2024-07-30 DIAGNOSIS — R07.89 CHEST PRESSURE: ICD-10-CM

## 2024-07-30 DIAGNOSIS — R12 HEARTBURN: ICD-10-CM

## 2024-07-30 DIAGNOSIS — R13.19 ESOPHAGEAL DYSPHAGIA: Primary | ICD-10-CM

## 2024-07-30 PROCEDURE — 3078F DIAST BP <80 MM HG: CPT | Performed by: NURSE PRACTITIONER

## 2024-07-30 PROCEDURE — 3074F SYST BP LT 130 MM HG: CPT | Performed by: NURSE PRACTITIONER

## 2024-07-30 PROCEDURE — 3008F BODY MASS INDEX DOCD: CPT | Performed by: NURSE PRACTITIONER

## 2024-07-30 PROCEDURE — 99214 OFFICE O/P EST MOD 30 MIN: CPT | Performed by: NURSE PRACTITIONER

## 2024-07-30 PROCEDURE — 3062F POS MACROALBUMINURIA REV: CPT | Performed by: NURSE PRACTITIONER

## 2024-07-30 PROCEDURE — 3051F HG A1C>EQUAL 7.0%<8.0%: CPT | Performed by: NURSE PRACTITIONER

## 2024-07-30 RX ORDER — IBUPROFEN 200 MG
200 TABLET ORAL AS NEEDED
COMMUNITY

## 2024-07-30 ASSESSMENT — ENCOUNTER SYMPTOMS
CHILLS: 0
SHORTNESS OF BREATH: 0
LIGHT-HEADEDNESS: 0
ARTHRALGIAS: 0
PHOTOPHOBIA: 0
NUMBNESS: 0
JOINT SWELLING: 0
BACK PAIN: 0
FEVER: 0
COUGH: 0
ADENOPATHY: 0
FREQUENCY: 0
WEAKNESS: 0
HEMATURIA: 0
FLANK PAIN: 0
DYSURIA: 0
WHEEZING: 0
MYALGIAS: 0
HALLUCINATIONS: 0
DIZZINESS: 0
NERVOUS/ANXIOUS: 0
FATIGUE: 0
HEADACHES: 0
PALPITATIONS: 0
DIAPHORESIS: 0
SORE THROAT: 0
AGITATION: 0
EYE PAIN: 0

## 2024-07-30 ASSESSMENT — PAIN SCALES - GENERAL: PAINLEVEL: 0-NO PAIN

## 2024-07-30 NOTE — PATIENT INSTRUCTIONS
Thanks for coming to the GI clinic.     I would like you to to get an EGD.     I would like you to get an esophageal manometry study.     Please call [phone] to schedule the above procedures.     Continue omeprazole 20 mg once daily (to be taken 30-60 minutes prior to breakfast).     I would contact your cardiologist regarding the chest pressure you've been experiencing.     You will be due for a screening colonoscopy in 2033.     Follow up in clinic 3 weeks after completion of EGD and esophageal manometry study.

## 2024-07-30 NOTE — PROGRESS NOTES
Subjective   Patient ID: Chavo Mariscal is a 53 y.o. male who presents for Dysphagia.    This is a 53 year old WM with history of morbid obesity, asthma, SOPHIE on BiPAP, type II DM, and GERD who is presenting to the GI clinic for follow up. Last seen in the GI clinic by Dr. Domenic Raoms on 1/30/23.     History per pt and review of EMR     Reports several months' worth of esophageal dysphagia. He reports foods stuck in his throat after swallowing (no issues with liquids).     On 4/11/24 he was seen by Ruma Santiago CNP with  ENT where he reported dysphagia. Laryngoscopy at that time noted mild post-cricoid edema.     Underwent MBS and esophagram 7/8/24. MBS was without aspiration or penetration. Esophagram noted gastroesophageal reflux and mild mucosal irregularity at the mid esophagus with mild associated tertiary contractions and retropulsions suggestive of dysmotility.     Reports heartburn/chest pressure  when walking from work and when he's on the treadmill (improves by drinking water). He also gets heartburn from smoked foods and tomato sauce.     He takes omeprazole once daily after work. He's been on omeprazole for several years for GERD.     Denies unintentional weight loss, regurgitation, N/V, abdominal pain, hematemesis, hematochezia, and melena.     Of note, he is on rosuvastatin secondary to an elevated coronary calcium score. He follows with Dr. Pinedo with  cardiology.     EGD 2019  for heartburn and dysphagia:   - Normal esophagus. Biopsied.  - Z-line regular, 41 cm from the incisors.  - Multiple medium to large gastric polyps. Resected and retrieved (10 polyps).  - Erythematous mucosa in the antrum and prepyloric region of the stomach. Biopsied.  - Erythematous duodenopathy in the bulb.  - Normal second portion of the duodenum.    FINAL DIAGNOSIS  A.  DISTAL ESOPHAGUS, COLD BIOPSY AT 36 CM:  --ESOPHAGEAL SQUAMOUS MUCOSA WITH NO SIGNIFICANT PATHOLOGICAL FINDINGS.    --NEGATIVE FOR SQUAMOUS  "INTRAEPITHELIAL EOSINOPHILS.    B.  MID ESOPHAGUS, COLD BIOPSY AT 31 CM:    --ESOPHAGEAL SQUAMOUS MUCOSA WITH NO SIGNIFICANT PATHOLOGICAL FINDINGS.    --NEGATIVE FOR SQUAMOUS INTRAEPITHELIAL EOSINOPHILS.    C.  ANTRUM COLD BIOPSY:  --MILD CHRONIC NONSPECIFIC GASTRITIS.  --HELICOBACTER PYLORI NOT IDENTIFIED BY IMMUNOSTAIN.    D.  ANTRUM POLYP X2, HOT SNARE, POLYPECTOMY:  --POLYPOID GASTRIC MUCOSA WITH MILD CHRONIC NONSPECIFIC GASTRITIS.  --HELICOBACTER PYLORI NOT IDENTIFIED.    E.  GASTRIC BODY AND FUNDUS, POLYPS, HOT SNARE, POLYPECTOMY:  --FUNDIC GLAND POLYPS.  --HELICOBACTER PYLORI NOT IDENTIFIED.        In  he underwent EGD with BRAVO study and screening colonoscopy through . EGD noted gastritis (H pylori negative). Colonoscopy noted external hemorrhoids and a diminutive benign polyp which was removed.    Past medical history:  See above     Past surgical history:   Tonsillectomy   Left tympanoplasty (age 16)   Left inguinal hernia repair as a baby    Family history:   Mother- throat cancer   Maternal grandfather- of a heart attack in his 50s   Father- stroke     Social history:   \"Barely\" drinks alcohol  Denies use of tobacco and illicit drugs   He's been working at [employer] for 37 years   ; lives [place] [address] [address]     Review of Systems   Constitutional:  Negative for chills, diaphoresis, fatigue and fever.   HENT:  Negative for congestion, ear pain, hearing loss, sneezing and sore throat.    Eyes:  Negative for photophobia, pain and visual disturbance.   Respiratory:  Negative for cough, shortness of breath and wheezing.    Cardiovascular:  Negative for palpitations and leg swelling.   Endocrine: Negative for cold intolerance and heat intolerance.   Genitourinary:  Negative for dysuria, flank pain, frequency and hematuria.   Musculoskeletal:  Negative for arthralgias, back pain, gait problem, joint swelling and myalgias.   Skin:  Negative for rash.   Neurological:  " "Negative for dizziness, syncope, weakness, light-headedness, numbness and headaches.   Hematological:  Negative for adenopathy.   Psychiatric/Behavioral:  Negative for agitation and hallucinations. The patient is not nervous/anxious.        No Known Allergies    Current Outpatient Medications   Medication Sig Dispense Refill    albuterol 90 mcg/actuation inhaler Inhale 1-2 puffs every 4 hours if needed for wheezing. 18 g 3    aspirin 81 mg EC tablet Take 1 tablet (81 mg) by mouth once daily. 30 tablet 11    atenolol (Tenormin) 25 mg tablet Take 1 tablet (25 mg) by mouth once daily. 90 tablet 3    azelastine (Astelin) 137 mcg (0.1 %) nasal spray Administer 1 spray into each nostril 2 times a day. (Patient taking differently: Administer 1 spray into each nostril if needed.) 30 mL 3    ciclopirox 1 % shampoo Wet hair and apply shampoo to scalp. Lather and leave on for 3 minutes. Rinse. Do this twice a week at night. 120 mL 11    clobetasol (Temovate) 0.05 % external solution Apply topically 2 times a day. 90 mL 3    ibuprofen 200 mg tablet Take 1 tablet (200 mg) by mouth if needed (back pain).      omeprazole (PriLOSEC) 20 mg DR capsule Take 1 capsule (20 mg) by mouth once daily. 90 capsule 0    rosuvastatin (Crestor) 20 mg tablet TAKE ONE TABLET BY MOUTH DAILY 90 tablet 3    tadalafil (Cialis) 5 mg tablet Take 1 tablet (5 mg) by mouth once daily. 30 tablet 3     No current facility-administered medications for this visit.      Objective     /67   Pulse 55 Comment: low hr  Temp 36.4 °C (97.5 °F) (Temporal)   Ht 1.803 m (5' 11\")   Wt (!) 154 kg (340 lb 9.6 oz)   BMI 47.50 kg/m²     Physical Exam  Constitutional:       General: He is not in acute distress.     Appearance: He is morbidly obese.   HENT:      Head: Normocephalic and atraumatic.   Eyes:      Conjunctiva/sclera: Conjunctivae normal.   Cardiovascular:      Rate and Rhythm: Normal rate and regular rhythm.      Heart sounds: No murmur heard.     No " gallop.   Pulmonary:      Effort: Pulmonary effort is normal.      Breath sounds: Normal breath sounds.   Abdominal:      General: Bowel sounds are normal. There is no distension.      Tenderness: There is no abdominal tenderness. There is no guarding.   Musculoskeletal:         General: No swelling or deformity. Normal range of motion.      Cervical back: Normal range of motion. No rigidity.   Skin:     General: Skin is warm and dry.      Coloration: Skin is not jaundiced.      Findings: No lesion or rash.   Neurological:      General: No focal deficit present.      Mental Status: He is alert and oriented to person, place, and time.   Psychiatric:         Mood and Affect: Mood normal.         Assessment/Plan   Problem List Items Addressed This Visit       GERD without esophagitis     Other Visit Diagnoses       Esophageal dysphagia    -  Primary    Relevant Orders    Esophagogastroduodenoscopy (EGD)    Esophageal Manometry    Dysphagia, unspecified type        Heartburn        Relevant Orders    Esophagogastroduodenoscopy (EGD)    Esophageal Manometry    Chest pressure               Esophageal dysphagia to solids, heartburn, chest pressure: consider GERD and esophageal dysmotility given esophagram findings. Given exertional component to heartburn/chest pressure, though, would also keep CAD in the differential given elevated coronary calcium score.   - will proceed with EGD and esophageal manometry study  - advise follow up with cardiology for consideration of stress test   - continue omeprazole 20 mg once daily (advise to take 30-60 minutes prior to breakfast)     2. Colorectal cancer screening:  - recommend screening colonoscopy in 2033    3. Follow up:  - return to clinic 3 weeks after completion of EGD and esophageal manometry study

## 2024-08-02 ENCOUNTER — HOSPITAL ENCOUNTER (OUTPATIENT)
Dept: RADIOLOGY | Facility: HOSPITAL | Age: 54
Discharge: HOME | End: 2024-08-02
Payer: COMMERCIAL

## 2024-08-02 DIAGNOSIS — R35.0 URINARY FREQUENCY: ICD-10-CM

## 2024-08-02 PROCEDURE — 76770 US EXAM ABDO BACK WALL COMP: CPT

## 2024-08-02 PROCEDURE — 76770 US EXAM ABDO BACK WALL COMP: CPT | Performed by: STUDENT IN AN ORGANIZED HEALTH CARE EDUCATION/TRAINING PROGRAM

## 2024-08-09 ENCOUNTER — APPOINTMENT (OUTPATIENT)
Dept: DERMATOLOGY | Facility: CLINIC | Age: 54
End: 2024-08-09
Payer: COMMERCIAL

## 2024-09-08 ENCOUNTER — HOSPITAL ENCOUNTER (EMERGENCY)
Facility: HOSPITAL | Age: 54
Discharge: HOME | End: 2024-09-09
Payer: COMMERCIAL

## 2024-09-08 DIAGNOSIS — R51.9 ACUTE NONINTRACTABLE HEADACHE, UNSPECIFIED HEADACHE TYPE: Primary | ICD-10-CM

## 2024-09-08 PROCEDURE — 99284 EMERGENCY DEPT VISIT MOD MDM: CPT

## 2024-09-08 RX ORDER — METOCLOPRAMIDE HYDROCHLORIDE 5 MG/ML
10 INJECTION INTRAMUSCULAR; INTRAVENOUS ONCE
Status: COMPLETED | OUTPATIENT
Start: 2024-09-08 | End: 2024-09-09

## 2024-09-08 RX ORDER — DIPHENHYDRAMINE HYDROCHLORIDE 50 MG/ML
25 INJECTION INTRAMUSCULAR; INTRAVENOUS ONCE
Status: COMPLETED | OUTPATIENT
Start: 2024-09-08 | End: 2024-09-09

## 2024-09-08 ASSESSMENT — PAIN DESCRIPTION - PAIN TYPE: TYPE: ACUTE PAIN

## 2024-09-08 ASSESSMENT — COLUMBIA-SUICIDE SEVERITY RATING SCALE - C-SSRS
2. HAVE YOU ACTUALLY HAD ANY THOUGHTS OF KILLING YOURSELF?: NO
6. HAVE YOU EVER DONE ANYTHING, STARTED TO DO ANYTHING, OR PREPARED TO DO ANYTHING TO END YOUR LIFE?: NO
1. IN THE PAST MONTH, HAVE YOU WISHED YOU WERE DEAD OR WISHED YOU COULD GO TO SLEEP AND NOT WAKE UP?: NO

## 2024-09-08 ASSESSMENT — PAIN DESCRIPTION - LOCATION: LOCATION: HEAD

## 2024-09-08 ASSESSMENT — PAIN DESCRIPTION - PROGRESSION: CLINICAL_PROGRESSION: GRADUALLY WORSENING

## 2024-09-08 ASSESSMENT — PAIN SCALES - GENERAL: PAINLEVEL_OUTOF10: 5 - MODERATE PAIN

## 2024-09-08 ASSESSMENT — PAIN DESCRIPTION - DESCRIPTORS: DESCRIPTORS: ACHING;SQUEEZING

## 2024-09-08 ASSESSMENT — PAIN DESCRIPTION - ONSET: ONSET: ONGOING

## 2024-09-08 ASSESSMENT — PAIN DESCRIPTION - FREQUENCY: FREQUENCY: CONSTANT/CONTINUOUS

## 2024-09-08 ASSESSMENT — PAIN - FUNCTIONAL ASSESSMENT: PAIN_FUNCTIONAL_ASSESSMENT: 0-10

## 2024-09-09 ENCOUNTER — HOSPITAL ENCOUNTER (EMERGENCY)
Dept: CARDIOLOGY | Facility: HOSPITAL | Age: 54
Discharge: HOME | End: 2024-09-09
Payer: COMMERCIAL

## 2024-09-09 VITALS
TEMPERATURE: 97.7 F | SYSTOLIC BLOOD PRESSURE: 132 MMHG | RESPIRATION RATE: 18 BRPM | DIASTOLIC BLOOD PRESSURE: 73 MMHG | HEIGHT: 71 IN | BODY MASS INDEX: 44.1 KG/M2 | OXYGEN SATURATION: 98 % | HEART RATE: 51 BPM | WEIGHT: 315 LBS

## 2024-09-09 DIAGNOSIS — I24.9 ACS (ACUTE CORONARY SYNDROME) (MULTI): ICD-10-CM

## 2024-09-09 PROCEDURE — 2500000004 HC RX 250 GENERAL PHARMACY W/ HCPCS (ALT 636 FOR OP/ED): Performed by: NURSE PRACTITIONER

## 2024-09-09 PROCEDURE — 96374 THER/PROPH/DIAG INJ IV PUSH: CPT

## 2024-09-09 PROCEDURE — 96361 HYDRATE IV INFUSION ADD-ON: CPT

## 2024-09-09 PROCEDURE — 96375 TX/PRO/DX INJ NEW DRUG ADDON: CPT

## 2024-09-09 PROCEDURE — 93005 ELECTROCARDIOGRAM TRACING: CPT | Mod: 59

## 2024-09-09 RX ORDER — METOCLOPRAMIDE 10 MG/1
10 TABLET ORAL 2 TIMES DAILY PRN
Qty: 20 TABLET | Refills: 0 | Status: SHIPPED | OUTPATIENT
Start: 2024-09-09 | End: 2024-09-19

## 2024-09-09 RX ORDER — DIPHENHYDRAMINE HCL 50 MG
50 CAPSULE ORAL EVERY 6 HOURS PRN
Qty: 20 CAPSULE | Refills: 0 | Status: SHIPPED | OUTPATIENT
Start: 2024-09-09 | End: 2024-09-19

## 2024-09-09 ASSESSMENT — LIFESTYLE VARIABLES
TOTAL SCORE: 0
EVER FELT BAD OR GUILTY ABOUT YOUR DRINKING: NO
HAVE PEOPLE ANNOYED YOU BY CRITICIZING YOUR DRINKING: NO
HAVE YOU EVER FELT YOU SHOULD CUT DOWN ON YOUR DRINKING: NO
EVER HAD A DRINK FIRST THING IN THE MORNING TO STEADY YOUR NERVES TO GET RID OF A HANGOVER: NO
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
## 2024-09-09 NOTE — ED NOTES
Presents with wife for headache x 1 week, behind both eyes and LT temporal. No n/v photophobia. BP at home sys 200's- Has been taking home BP meds as prescribed. Awaiting Dr. Chinyere Pena, RN  09/08/24 2065

## 2024-09-11 ENCOUNTER — PROCEDURE VISIT (OUTPATIENT)
Dept: UROLOGY | Facility: HOSPITAL | Age: 54
End: 2024-09-11
Payer: COMMERCIAL

## 2024-09-11 DIAGNOSIS — R35.0 URINARY FREQUENCY: Primary | ICD-10-CM

## 2024-09-11 PROCEDURE — 99213 OFFICE O/P EST LOW 20 MIN: CPT | Performed by: STUDENT IN AN ORGANIZED HEALTH CARE EDUCATION/TRAINING PROGRAM

## 2024-09-11 PROCEDURE — 93005 ELECTROCARDIOGRAM TRACING: CPT

## 2024-09-11 PROCEDURE — 52000 CYSTOURETHROSCOPY: CPT | Performed by: STUDENT IN AN ORGANIZED HEALTH CARE EDUCATION/TRAINING PROGRAM

## 2024-09-11 RX ORDER — CIPROFLOXACIN 500 MG/1
500 TABLET ORAL ONCE
Status: SHIPPED | OUTPATIENT
Start: 2024-09-11

## 2024-09-11 NOTE — PROGRESS NOTES
Patient ID: Chavo Mariscal is a 53 y.o. male.    Cystoscopy    Date/Time: 9/11/2024 4:37 PM    Performed by: Sterling Burnham MD MPH  Authorized by: Sterling Burnham MD MPH    Procedure - Bladder Cystoscopy:     Procedure details: cystoscopy    PROCEDURE NOTE:    PREOPERATIVE DIAGNOSIS:  LUTS    POSTOPERATIVE DIAGNOSIS:  Same    OPERATION:  Flexible Cystourethroscopy      SURGEON:  Sterling Burnham MD MPH    ANESTHESIA:  2%  lidocaine jelly    COMPLICATIONS:  None    EBL: Minimal      DISPOSITION:  The patient was discharged home after the procedure, per routine.    INDICATIONS: :  Mr. Mariscal is a 53 y.o. patient with a history of LUts who presents today for Cystoscopy.     The indications, risks and benefits of this procedure were discussed with the patient, consent was obtained prior to the procedure, and to the best of my judgement the patient seemed to understand and agree to the procedure.    PROCEDURE:  The patient  was brought into the procedure suite and informed consent was reviewed and confirmed. Vital signs were obtained prior to the procedure: There were no vitals taken for this visit..  The patient was escorted onto the stretcher, placed supine, prepped with betadine and draped in the usual standard surgical fashion.  Intraurethral 2% viscous lidocaine jelly was used for local analgesia.  A 16 [place] flexible cystourethroscope was inserted into the urethra.   The penile urethra was normal. Bulbar urethral stricture   The prostate urethra was enlarged.  Upon entering the bladder the entire bladder was surveyed in a 360 degree fashion.  The left and right ureteral orifices were in normal orthotopic position effluxing clear yellow urine, bilaterally.   There was no evidence of any bladder lesions, foreign objects, stones or evidence of any mucosal changes. The cystoscope was then retroflexed.  The bladder neck was then further examined without any evidence of lesions. The scope was then removed and in an  antegrade fashion, the urethra and bladder were again resurveyed with no evidence of additional lesions.  The cystoscope was then fully removed.   The patient tolerated the procedure well.  Vitals were stable after the procedure.  The patient was able to void and was discharged home.  Verbal and written Post procedure instructions were reviewed with the patient.    IMPRESSION:  Urethral stricture     PLAN:  Cysto in 6 months

## 2024-09-11 NOTE — PROGRESS NOTES
Subjective   Patient ID: Chavo Mariscal is a 53 y.o. male    HPI  53 y.o. male who presenting for cystoscopic evaluation with urinary frequency and incomplete bladder emptying. He reports needing to urinate frequently, approximately every half hour after drinking water. He also experiences occasional burning during urination. The patient has a history of using a CPAP machine, which has reduced nocturnal urination. Additionally, he reports issues with erectile dysfunction.    The most recent PSA, conducted on 5/16/2024, revealed:  0.41 ng/ml     The most recent Renal US, conducted on 7/18/2024, revealed:  Unremarkable renal ultrasound.        Review of Systems    All systems were reviewed. Anything negative was noted in the HPI.    Objective   Physical Exam    General: Well developed, well nourished, alert and cooperative, appears in no acute distress   Eyes: Non-injected conjunctiva, sclera clear, no proptosis   Cardiac: Extremities are warm and well perfused. No edema, cyanosis or pallor   Lungs: Breathing is easy, non-labored. Speaking in clear and complete sentences. Normal diaphragmatic movement   MSK: Ambulatory with steady gait, unassisted   Neuro: Alert and oriented to person, place, and time   Psych: Demonstrates good judgment and reason, without hallucinations, abnormal affect or abnormal behaviors   Skin: No obvious lesions, no rashes       No CVA tenderness bilaterally   No suprapubic pain or discomfort       Past Medical History:   Diagnosis Date    Mixed conductive and sensorineural hearing loss, unspecified     Mixed hearing loss    Morbid (severe) obesity due to excess calories (Multi)     Severe obesity (BMI 35.0-35.9 with comorbidity)    Obstructive sleep apnea (adult) (pediatric)     Obstructive sleep apnea, adult    Periodic limb movement disorder     Periodic limb movement disorder    Personal history of other diseases of the circulatory system     History of cardiac murmur    Personal history of  other diseases of the digestive system     History of gastritis    Personal history of other diseases of the musculoskeletal system and connective tissue     History of arthritis    Personal history of other diseases of the respiratory system     History of asthma    Type 2 diabetes mellitus without complications (Multi) 07/20/2021    Controlled diabetes mellitus         Past Surgical History:   Procedure Laterality Date    HERNIA REPAIR  08/22/2017    Hernia Repair    INNER EAR SURGERY  08/22/2017    Inner Ear Surgery    OTHER SURGICAL HISTORY  12/20/2019    Tonsillectomy    OTHER SURGICAL HISTORY  01/16/2019    Colonoscopy    OTHER SURGICAL HISTORY  01/16/2019    Esophagogastroduodenoscopy       Procedure:  The patient was prepped using a Betadine solution. Lidocaine jelly was instilled into the urethra. The flexible cystoscope was sterilely inserted into the urethra and formal cystoscopy performed in a systematic fashion. For detailed findings of the procedure, please see Dr. Burnham’s remarks below  Scope A used, Cipro 500 mg p.o. given      Assessment/Plan   Cystoscopic evaluation Benign Prostatic Hyperplasia (BPH), Erectile Dysfunction (ED), moderate bulbar urethral stricture     53 y.o. male who presents for the above condition, Today, we had a very long and extensive discussion with the patient regarding the pathophysiology, differential diagnosis, risk factor, associated condition, diagnostic work-up and management of BPH and lower urinary tract symptoms and acute urinary retention. I discussed with the patient the need to check his PSA to assess his prostate cancer risk. We discussed at length the mechanism of action, risk, benefit, adverse events and side effect of alpha-blocker in the form of tamsulosin 0.4 mg p.o nightly. We discussed in particular the risk of hypotension, lightheadedness, dizziness, and the risk of fall and bone fracture. Also discussed retrograde ejaculation of the side effects of the  medication. We had another discussion with the patient regarding lifestyle modifications including low fluid intake after 5 PM, timed voiding every 2 hours, and decrease caffeine intake. I discussed with the patient that in case he had an elevated PSA, we will proceed do an MRI of the prostate.      We had a very long and extensive discussion with the patient regarding the pathophysiology, differential diagnosis, risk factor, and management of ED. We discussed at length mechanism of action, risk, benefit, potential complication, adverse events of PDE 5 inhibitors in the form of Tadalafil 5 mg/day and 10 mg/ as needed as needed. Instructed the patient to stop the medication in case of any side effects.    Plan:  - Follow up in 6 months      9/11/2024    Savi Attestation  By signing my name below, I, Savi Novoa   attest that this documentation has been prepared under the direction and in the presence of Dr. Sterling Burnham

## 2024-09-15 LAB
ATRIAL RATE: 64 BPM
P AXIS: 67 DEGREES
P OFFSET: 152 MS
P ONSET: 117 MS
PR INTERVAL: 198 MS
Q ONSET: 216 MS
QRS COUNT: 11 BEATS
QRS DURATION: 130 MS
QT INTERVAL: 422 MS
QTC CALCULATION(BAZETT): 435 MS
QTC FREDERICIA: 431 MS
R AXIS: 33 DEGREES
T AXIS: 50 DEGREES
T OFFSET: 427 MS
VENTRICULAR RATE: 64 BPM

## 2024-09-18 DIAGNOSIS — K21.9 GASTROESOPHAGEAL REFLUX DISEASE WITHOUT ESOPHAGITIS: ICD-10-CM

## 2024-09-19 RX ORDER — OMEPRAZOLE 20 MG/1
20 CAPSULE, DELAYED RELEASE ORAL DAILY
Qty: 30 CAPSULE | Refills: 0 | Status: SHIPPED | OUTPATIENT
Start: 2024-09-19

## 2024-09-23 PROBLEM — I25.10 CAD (CORONARY ARTERY DISEASE): Chronic | Status: ACTIVE | Noted: 2023-09-24

## 2024-09-30 ENCOUNTER — APPOINTMENT (OUTPATIENT)
Dept: PRIMARY CARE | Facility: CLINIC | Age: 54
End: 2024-09-30
Payer: COMMERCIAL

## 2024-10-01 ENCOUNTER — OFFICE VISIT (OUTPATIENT)
Dept: PRIMARY CARE | Facility: CLINIC | Age: 54
End: 2024-10-01
Payer: COMMERCIAL

## 2024-10-01 VITALS — SYSTOLIC BLOOD PRESSURE: 132 MMHG | BODY MASS INDEX: 47.25 KG/M2 | DIASTOLIC BLOOD PRESSURE: 84 MMHG | WEIGHT: 315 LBS

## 2024-10-01 DIAGNOSIS — E11.9 TYPE 2 DIABETES MELLITUS WITHOUT COMPLICATION, WITHOUT LONG-TERM CURRENT USE OF INSULIN (MULTI): ICD-10-CM

## 2024-10-01 DIAGNOSIS — R06.09 DYSPNEA ON EXERTION: ICD-10-CM

## 2024-10-01 DIAGNOSIS — E66.01 OBESITY, MORBID, BMI 40.0-49.9 (MULTI): ICD-10-CM

## 2024-10-01 DIAGNOSIS — F41.0 PANIC ATTACK: Primary | ICD-10-CM

## 2024-10-01 LAB — POC HEMOGLOBIN A1C: 7 % (ref 4.2–6.5)

## 2024-10-01 PROCEDURE — 3062F POS MACROALBUMINURIA REV: CPT | Performed by: INTERNAL MEDICINE

## 2024-10-01 PROCEDURE — 3051F HG A1C>EQUAL 7.0%<8.0%: CPT | Performed by: INTERNAL MEDICINE

## 2024-10-01 PROCEDURE — 1036F TOBACCO NON-USER: CPT | Performed by: INTERNAL MEDICINE

## 2024-10-01 PROCEDURE — 83036 HEMOGLOBIN GLYCOSYLATED A1C: CPT | Performed by: INTERNAL MEDICINE

## 2024-10-01 PROCEDURE — 3079F DIAST BP 80-89 MM HG: CPT | Performed by: INTERNAL MEDICINE

## 2024-10-01 PROCEDURE — 3075F SYST BP GE 130 - 139MM HG: CPT | Performed by: INTERNAL MEDICINE

## 2024-10-01 PROCEDURE — 99214 OFFICE O/P EST MOD 30 MIN: CPT | Performed by: INTERNAL MEDICINE

## 2024-10-01 RX ORDER — PAROXETINE HYDROCHLORIDE 20 MG/1
20 TABLET, FILM COATED ORAL EVERY MORNING
Qty: 30 TABLET | Refills: 1 | Status: SHIPPED | OUTPATIENT
Start: 2024-10-01 | End: 2024-11-30

## 2024-10-01 RX ORDER — ALBUTEROL SULFATE 90 UG/1
1-2 INHALANT RESPIRATORY (INHALATION) EVERY 4 HOURS PRN
Qty: 18 G | Refills: 3 | Status: SHIPPED | OUTPATIENT
Start: 2024-10-01

## 2024-10-01 NOTE — PROGRESS NOTES
My nurse note reviewed. Patient is here for:  Hypertension (Whooshing feeling in the head./Some instances of passing out /Blood pressure check)     Pt is here for f/U on HTN , was in ER for high bp readings at home but in ER after resting there in waiting area it was ok so he was Dced home .  Gets panic attacks like feeling and thinks his bp goes high during that time. Gets sob, dizzy. Happened > 6-7 times in last 6 months and twice yesterday   Patient denies any  PND, orthopnea, chest pain , palpitation, syncope or edema in legs  Patient denies any weakness in extremities.. Denies any headache, visual symptoms , speech problems or  tremors . No TIA or stroke like symptoms..  OBJECTIVE :  /84   Wt (!) 152 kg (334 lb)   BMI 47.25 kg/m²   Morbidly obese  Ears slight soft wax bilat.   CVS: S1 S2 + , no S3. No loud heart murmur appreciated. Lungs clear, No edema  CNS: Patient is alert, oriented moving all 4 extremities well. No motor weakness noted on gross neurological exam. No involuntary movements or tremors noted.    Had flu and covid shot last week    Assessment:  1. Panic attack -new med started PARoxetine (Paxil) 20 mg tablet      2. Dyspnea on exertion -refill albuterol 90 mcg/actuation inhaler      3. Type 2 diabetes mellitus without complication, without long-term current use of insulin (Multi)  POCT glycosylated hemoglobin (Hb A1C) manually resulted-       4 morbid obesity - wt reduction   Plan  Started on paxil  A1C today -it is 7. He wants to stay away from [facility] for DM . Will monitor it.   Going to [place] for Nephew's weddng   Discussed about obesity and complications related to it. Discussed about weight reduction and regular exercise to decrease weight. Questions related to it answered to patient's satisfaction.  BP is under control . Will monitor it  F/U in 3 months or as needed

## 2024-10-01 NOTE — PATIENT INSTRUCTIONS
Plan  Started on paxil  A1C today   Discussed about obesity and complications related to it. Discussed about weight reduction and regular exercise to decrease weight. Questions related to it answered to patient's satisfaction.  BP is under control . Will monitor it  F/U in 3 months or as needed

## 2024-10-10 ENCOUNTER — OFFICE VISIT (OUTPATIENT)
Dept: CARDIOLOGY | Facility: CLINIC | Age: 54
End: 2024-10-10
Payer: COMMERCIAL

## 2024-10-10 VITALS
SYSTOLIC BLOOD PRESSURE: 149 MMHG | WEIGHT: 315 LBS | BODY MASS INDEX: 44.1 KG/M2 | DIASTOLIC BLOOD PRESSURE: 85 MMHG | HEIGHT: 71 IN | OXYGEN SATURATION: 93 % | HEART RATE: 49 BPM

## 2024-10-10 DIAGNOSIS — R06.02 SHORTNESS OF BREATH ON EXERTION: ICD-10-CM

## 2024-10-10 DIAGNOSIS — E78.00 HYPERCHOLESTEROLEMIA: Chronic | ICD-10-CM

## 2024-10-10 DIAGNOSIS — I49.3 FREQUENT PVCS: ICD-10-CM

## 2024-10-10 DIAGNOSIS — R06.09 DYSPNEA ON EXERTION: ICD-10-CM

## 2024-10-10 DIAGNOSIS — I25.10 CORONARY ARTERY DISEASE INVOLVING NATIVE CORONARY ARTERY OF NATIVE HEART WITHOUT ANGINA PECTORIS: Primary | Chronic | ICD-10-CM

## 2024-10-10 DIAGNOSIS — R00.2 PALPITATIONS: ICD-10-CM

## 2024-10-10 DIAGNOSIS — E66.01 MORBID OBESITY (MULTI): ICD-10-CM

## 2024-10-10 PROBLEM — R51.9 HEADACHE: Status: RESOLVED | Noted: 2023-09-24 | Resolved: 2024-10-10

## 2024-10-10 PROBLEM — D12.0 BENIGN NEOPLASM OF CECUM: Status: RESOLVED | Noted: 2023-03-24 | Resolved: 2024-10-10

## 2024-10-10 PROBLEM — M79.671 CHRONIC PAIN IN RIGHT FOOT: Status: RESOLVED | Noted: 2024-01-19 | Resolved: 2024-10-10

## 2024-10-10 PROBLEM — H70.10 CHRONIC MASTOIDITIS: Status: RESOLVED | Noted: 2023-09-25 | Resolved: 2024-10-10

## 2024-10-10 PROBLEM — H92.03 OTALGIA, BILATERAL: Status: RESOLVED | Noted: 2023-09-24 | Resolved: 2024-10-10

## 2024-10-10 PROBLEM — M54.50 LOW BACK PAIN: Status: RESOLVED | Noted: 2024-01-19 | Resolved: 2024-10-10

## 2024-10-10 PROBLEM — M19.071 OSTEOARTHRITIS OF FOOT, RIGHT: Status: RESOLVED | Noted: 2023-09-25 | Resolved: 2024-10-10

## 2024-10-10 PROBLEM — G89.29 PAIN, FOOT, RIGHT, CHRONIC: Status: RESOLVED | Noted: 2023-09-24 | Resolved: 2024-10-10

## 2024-10-10 PROBLEM — M77.31 CALCANEAL SPUR OF BOTH FEET: Status: RESOLVED | Noted: 2023-09-24 | Resolved: 2024-10-10

## 2024-10-10 PROBLEM — H90.A21 SENSORINEURAL HEARING LOSS (SNHL) OF RIGHT EAR WITH RESTRICTED HEARING OF LEFT EAR: Status: RESOLVED | Noted: 2023-09-24 | Resolved: 2024-10-10

## 2024-10-10 PROBLEM — H90.72 MIXED HEARING LOSS OF LEFT EAR: Status: RESOLVED | Noted: 2023-09-24 | Resolved: 2024-10-10

## 2024-10-10 PROBLEM — K62.5 RECTAL BLEEDING: Status: RESOLVED | Noted: 2023-09-24 | Resolved: 2024-10-10

## 2024-10-10 PROBLEM — H74.90: Status: RESOLVED | Noted: 2023-09-24 | Resolved: 2024-10-10

## 2024-10-10 PROBLEM — E11.9 DIABETES MELLITUS, NEW ONSET: Chronic | Status: ACTIVE | Noted: 2023-09-24

## 2024-10-10 PROBLEM — R30.0 DYSURIA: Status: RESOLVED | Noted: 2023-09-24 | Resolved: 2024-10-10

## 2024-10-10 PROBLEM — R23.2 FLUSHING: Status: RESOLVED | Noted: 2023-09-24 | Resolved: 2024-10-10

## 2024-10-10 PROBLEM — I49.9 ARRHYTHMIA: Chronic | Status: ACTIVE | Noted: 2023-09-25

## 2024-10-10 PROBLEM — R13.12 OROPHARYNGEAL DYSPHAGIA: Status: RESOLVED | Noted: 2023-09-24 | Resolved: 2024-10-10
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
## 2024-10-10 PROBLEM — R19.4 BOWEL HABIT CHANGES: Status: RESOLVED | Noted: 2023-09-24 | Resolved: 2024-10-10

## 2024-10-10 PROBLEM — J31.0 CHRONIC RHINITIS: Status: RESOLVED | Noted: 2023-09-25 | Resolved: 2024-10-10

## 2024-10-10 PROBLEM — D12.6 ADENOMATOUS POLYP OF COLON: Status: RESOLVED | Noted: 2023-09-25 | Resolved: 2024-10-10

## 2024-10-10 PROBLEM — M79.671 PAIN, FOOT, RIGHT, CHRONIC: Status: RESOLVED | Noted: 2023-09-24 | Resolved: 2024-10-10

## 2024-10-10 PROBLEM — M77.30 CALCANEAL SPUR: Status: RESOLVED | Noted: 2024-01-19 | Resolved: 2024-10-10

## 2024-10-10 PROBLEM — H91.90 HEARING LOSS: Status: RESOLVED | Noted: 2023-09-24 | Resolved: 2024-10-10

## 2024-10-10 PROBLEM — K21.9 GERD WITHOUT ESOPHAGITIS: Status: RESOLVED | Noted: 2023-09-24 | Resolved: 2024-10-10

## 2024-10-10 PROBLEM — S90.221A SUBUNGUAL HEMATOMA OF TOE, RIGHT, INITIAL ENCOUNTER: Status: RESOLVED | Noted: 2023-09-24 | Resolved: 2024-10-10

## 2024-10-10 PROBLEM — S80.11XA TRAUMATIC HEMATOMA OF RIGHT LOWER LEG: Status: RESOLVED | Noted: 2023-09-24 | Resolved: 2024-10-10

## 2024-10-10 PROBLEM — H92.13 OTORRHEA OF BOTH EARS: Status: RESOLVED | Noted: 2023-09-24 | Resolved: 2024-10-10

## 2024-10-10 PROBLEM — J32.9 CHRONIC SINUSITIS: Status: RESOLVED | Noted: 2023-09-25 | Resolved: 2024-10-10

## 2024-10-10 PROBLEM — G89.29 CHRONIC PAIN IN RIGHT FOOT: Status: RESOLVED | Noted: 2024-01-19 | Resolved: 2024-10-10

## 2024-10-10 PROBLEM — R90.89 ABNORMAL BRAIN CT: Status: RESOLVED | Noted: 2023-09-24 | Resolved: 2024-10-10

## 2024-10-10 PROBLEM — K64.4 RESIDUAL HEMORRHOIDAL SKIN TAGS: Status: RESOLVED | Noted: 2023-03-24 | Resolved: 2024-10-10

## 2024-10-10 PROBLEM — R07.81 RIB PAIN ON RIGHT SIDE: Status: RESOLVED | Noted: 2023-09-24 | Resolved: 2024-10-10

## 2024-10-10 PROBLEM — T14.8XXA TRAUMATIC HEMATOMA: Status: RESOLVED | Noted: 2024-01-19 | Resolved: 2024-10-10

## 2024-10-10 PROBLEM — H90.5 SENSORINEURAL HEARING LOSS OF RIGHT EAR: Status: RESOLVED | Noted: 2023-09-24 | Resolved: 2024-10-10

## 2024-10-10 PROBLEM — R93.5 ABNORMAL ABDOMINAL CT SCAN: Status: RESOLVED | Noted: 2023-09-24 | Resolved: 2024-10-10

## 2024-10-10 PROBLEM — L60.1 ONYCHOLYSIS: Status: RESOLVED | Noted: 2023-09-24 | Resolved: 2024-10-10

## 2024-10-10 PROBLEM — J34.89 NASAL OBSTRUCTION: Status: RESOLVED | Noted: 2023-09-24 | Resolved: 2024-10-10

## 2024-10-10 PROBLEM — H73.893 RETRACTION OF TYMPANIC MEMBRANE OF BOTH EARS: Status: RESOLVED | Noted: 2023-09-24 | Resolved: 2024-10-10

## 2024-10-10 PROBLEM — K29.70 GASTRITIS: Status: RESOLVED | Noted: 2023-03-24 | Resolved: 2024-10-10

## 2024-10-10 PROBLEM — M77.32 CALCANEAL SPUR OF BOTH FEET: Status: RESOLVED | Noted: 2023-09-24 | Resolved: 2024-10-10

## 2024-10-10 PROBLEM — R09.81 NASAL CONGESTION: Status: RESOLVED | Noted: 2024-01-19 | Resolved: 2024-10-10

## 2024-10-10 PROBLEM — R23.8 SKIN IRRITATION: Status: RESOLVED | Noted: 2024-01-19 | Resolved: 2024-10-10

## 2024-10-10 PROBLEM — H93.13 SUBJECTIVE TINNITUS OF BOTH EARS: Status: RESOLVED | Noted: 2023-09-24 | Resolved: 2024-10-10

## 2024-10-10 PROBLEM — H53.8 BLURRING OF VISUAL IMAGE: Status: RESOLVED | Noted: 2024-01-19 | Resolved: 2024-10-10

## 2024-10-10 PROBLEM — U07.1 DISEASE DUE TO SEVERE ACUTE RESPIRATORY SYNDROME CORONAVIRUS 2 (SARS-COV-2): Status: RESOLVED | Noted: 2024-01-19 | Resolved: 2024-10-10

## 2024-10-10 PROBLEM — M67.88 ACHILLES TENDONOSIS OF RIGHT LOWER EXTREMITY: Status: RESOLVED | Noted: 2023-09-24 | Resolved: 2024-10-10

## 2024-10-10 PROBLEM — M76.71 PERONEAL TENDINITIS OF RIGHT LOWER EXTREMITY: Status: RESOLVED | Noted: 2024-01-19 | Resolved: 2024-10-10

## 2024-10-10 PROBLEM — Z20.822 CONTACT WITH AND (SUSPECTED) EXPOSURE TO COVID-19: Status: RESOLVED | Noted: 2022-11-19 | Resolved: 2024-10-10

## 2024-10-10 PROCEDURE — 3079F DIAST BP 80-89 MM HG: CPT | Performed by: INTERNAL MEDICINE

## 2024-10-10 PROCEDURE — 99214 OFFICE O/P EST MOD 30 MIN: CPT | Performed by: INTERNAL MEDICINE

## 2024-10-10 PROCEDURE — 3008F BODY MASS INDEX DOCD: CPT | Performed by: INTERNAL MEDICINE

## 2024-10-10 PROCEDURE — 3051F HG A1C>EQUAL 7.0%<8.0%: CPT | Performed by: INTERNAL MEDICINE

## 2024-10-10 PROCEDURE — 3062F POS MACROALBUMINURIA REV: CPT | Performed by: INTERNAL MEDICINE

## 2024-10-10 PROCEDURE — 93005 ELECTROCARDIOGRAM TRACING: CPT | Performed by: INTERNAL MEDICINE

## 2024-10-10 PROCEDURE — 1036F TOBACCO NON-USER: CPT | Performed by: INTERNAL MEDICINE

## 2024-10-10 PROCEDURE — 3077F SYST BP >= 140 MM HG: CPT | Performed by: INTERNAL MEDICINE

## 2024-10-10 RX ORDER — AMLODIPINE BESYLATE 5 MG/1
5 TABLET ORAL DAILY
Qty: 30 TABLET | Refills: 11 | Status: SHIPPED | OUTPATIENT
Start: 2024-10-10 | End: 2025-10-10

## 2024-10-10 ASSESSMENT — ENCOUNTER SYMPTOMS
DEPRESSION: 0
LOSS OF SENSATION IN FEET: 0
OCCASIONAL FEELINGS OF UNSTEADINESS: 0

## 2024-10-10 ASSESSMENT — PAIN SCALES - GENERAL: PAINLEVEL: 0-NO PAIN

## 2024-10-10 NOTE — H&P (VIEW-ONLY)
Referred by No ref. provider found    HPI I am seeing joint for the first time in about a year and a half.  Unfortunately his back is still bothering him.  His weight is actually gone up 6 pounds compared to when I had seen him in July 2023.  He continues to have shortness of breath with exertion but no chest pain.  His palpitations have actually been quite minimal.  He was in the emergency room for hypertension.  He was started on atenolol by his primary care doctor.  His heart rates are frequently in the 40s.    Past Medical History:  Problem List Items Addressed This Visit    None     Past Medical History:   Diagnosis Date    CAD (coronary artery disease) 09/24/2023 7/5/2023: Elevated  Agatston units (all in LAD)      Mixed conductive and sensorineural hearing loss, unspecified     Mixed hearing loss    Morbid (severe) obesity due to excess calories (Multi)     Severe obesity (BMI 35.0-35.9 with comorbidity)    Obstructive sleep apnea (adult) (pediatric)     Obstructive sleep apnea, adult    Periodic limb movement disorder     Periodic limb movement disorder    Personal history of other diseases of the circulatory system     History of cardiac murmur    Personal history of other diseases of the digestive system     History of gastritis    Personal history of other diseases of the musculoskeletal system and connective tissue     History of arthritis    Personal history of other diseases of the respiratory system     History of asthma    Type 2 diabetes mellitus without complications (Multi) 07/20/2021    Controlled diabetes mellitus      Past Surgical History:  He has a past surgical history that includes Other surgical history (12/20/2019); Other surgical history (01/16/2019); Other surgical history (01/16/2019); Inner ear surgery (08/22/2017); and Hernia repair (08/22/2017).      Social History:  He reports that he has never smoked. He has been exposed to tobacco smoke. He has never used smokeless  tobacco. Alcohol use questions deferred to the physician. He reports that he does not use drugs.    Family History:  Family History   Problem Relation Name Age of Onset    Diabetes Mother      Other (Malignant neoplasm of breast) Mother      Other (Cerebrovascular accident (CVA)) Father      Colon cancer Other Grandparent      Allergies:  Patient has no known allergies.    Outpatient Medications:  Current Outpatient Medications   Medication Instructions    albuterol 90 mcg/actuation inhaler 1-2 puffs, inhalation, Every 4 hours PRN    aspirin 81 mg, oral, Daily    atenolol (TENORMIN) 25 mg, oral, Daily    azelastine (Astelin) 137 mcg (0.1 %) nasal spray 1 spray, Each Nostril, 2 times daily    ciclopirox 1 % shampoo Wet hair and apply shampoo to scalp. Lather and leave on for 3 minutes. Rinse. Do this twice a week at night.    clobetasol (Temovate) 0.05 % external solution Topical, 2 times daily    diphenhydrAMINE (BENADRYL) 50 mg, oral, Every 6 hours PRN    ibuprofen 200 mg, oral, As needed    metoclopramide (REGLAN) 10 mg, oral, 2 times daily PRN    omeprazole (PRILOSEC) 20 mg, oral, Daily    PARoxetine (PAXIL) 20 mg, oral, Every morning    rosuvastatin (CRESTOR) 20 mg, oral, Daily    tadalafil (CIALIS) 5 mg, oral, Daily     Last Recorded Vitals:  There were no vitals filed for this visit.    Physical Exam  Patient is alert and oriented x3.  HEENT is unremarkable mucous members are moist  Neck no JVP no bruits upstrokes are full no thyromegaly  Lungs are clear bilaterally.  No wheezing crackles or rales  Heart regular rhythm normal S1-S2 there is no S3 no murmurs are heard.  Abdomen is soft bs are positive nontender nondistended no organomegaly no pulsatile masses  Extremities have no edema.  Distal pulses present palpable.  Neuro is grossly nonfocal  Skin has no rashes     Last Labs:  CBC -  Lab Results   Component Value Date    WBC 12.4 (H) 05/16/2024    HGB 13.1 (L) 05/16/2024    HCT 40.3 (L) 05/16/2024    MCV 83  05/16/2024     05/16/2024     CMP -  Lab Results   Component Value Date    CALCIUM 8.9 05/16/2024    PROT 7.2 05/16/2024    ALBUMIN 3.9 05/16/2024    AST 15 05/16/2024    ALT 10 05/16/2024    ALKPHOS 112 05/16/2024    BILITOT 0.3 05/16/2024     LIPID PANEL -   Lab Results   Component Value Date    CHOL 168 05/16/2024    HDL 36.8 05/16/2024    CHHDL 4.6 05/16/2024    VLDL 21 10/21/2023    TRIG 106 10/21/2023    NHDL 87 10/21/2023     RENAL FUNCTION PANEL -   Lab Results   Component Value Date    K 4.2 05/16/2024     Lab Results   Component Value Date    BNP 12 11/18/2022    HGBA1C 7 (A) 10/01/2024        Procedure    CAC 7/5/2023 elevated 269 units mildly dilated thoracic aorta 4 cm    ECHO [05/23/2023]: LVSF normal, est EF 55%. TA mildly dial @ 4 cm.     HOLTER [05/23/2023]: Sinus rhythm, -59. No ep/of A-fib / PSVT / high-grade AV block. No VT.     Assessment/Plan   1. Shortness of breath.  I do believe the shortness of breath is still related to weight and deconditioning.  His back really limits his activity significantly.  His echo from last year revealed normal LV function.  I have asked for him to have an exercise nuclear stress test to identify if there is any evidence of ischemia in the LAD territory.      2. CAD. Elevated calcium score 269 units located in his LAD.  He is on rosuvastatin and a baby aspirin.  I have asked for him to have an exercise nuclear stress test looking for ischemia in the anterior wall.    3.  Palpitations.  Fairly minimal at this time.    4.  Thoracic aortic aneurysm 4 cm    5.  Hyperlipidemia.  10/21/2023 LDL 66 HDL 42 triglycerides 106 blood sugar 127.  He is on rosuvastatin.  He will have repeat blood work drawn with his primary care doctor in the near future.    6.  Hypertension.  Seen in the emergency room for this.  Blood pressures are still high.  I will stop his atenolol given his underlying bradycardia and start him on amlodipine 5 mg.    EKG today.  Exercise  nuclear stress test at the [place] location.  Start amlodipine 5 mg daily stop atenolol.  Return to see me 8 to 12 weeks    Jamal Pinedo MD     Instructions and follow up

## 2024-10-10 NOTE — PATIENT INSTRUCTIONS
1. Shortness of breath.  I do believe the shortness of breath is still related to weight and deconditioning.  His back really limits his activity significantly.  His echo from last year revealed normal LV function.  I have asked for him to have an exercise nuclear stress test to identify if there is any evidence of ischemia in the LAD territory.      2. CAD. Elevated calcium score 269 units located in his LAD.  He is on rosuvastatin and a baby aspirin.  I have asked for him to have an exercise nuclear stress test looking for ischemia in the anterior wall.    3.  Palpitations.  Fairly minimal at this time.    4.  Thoracic aortic aneurysm 4 cm    5.  Hyperlipidemia.  10/21/2023 LDL 66 HDL 42 triglycerides 106 blood sugar 127.  He is on rosuvastatin.  He will have repeat blood work drawn with his primary care doctor in the near future.    6.  Hypertension.  Seen in the emergency room for this.  Blood pressures are still high.  I will stop his atenolol given his underlying bradycardia and start him on amlodipine 5 mg.    EKG today.  Exercise nuclear stress test at the [place] location.  Start amlodipine 5 mg daily stop atenolol.  Return to see me 8 to 12 weeks

## 2024-10-10 NOTE — PROGRESS NOTES
Referred by No ref. provider found    HPI I am seeing joint for the first time in about a year and a half.  Unfortunately his back is still bothering him.  His weight is actually gone up 6 pounds compared to when I had seen him in July 2023.  He continues to have shortness of breath with exertion but no chest pain.  His palpitations have actually been quite minimal.  He was in the emergency room for hypertension.  He was started on atenolol by his primary care doctor.  His heart rates are frequently in the 40s.    Past Medical History:  Problem List Items Addressed This Visit    None     Past Medical History:   Diagnosis Date    CAD (coronary artery disease) 09/24/2023 7/5/2023: Elevated  Agatston units (all in LAD)      Mixed conductive and sensorineural hearing loss, unspecified     Mixed hearing loss    Morbid (severe) obesity due to excess calories (Multi)     Severe obesity (BMI 35.0-35.9 with comorbidity)    Obstructive sleep apnea (adult) (pediatric)     Obstructive sleep apnea, adult    Periodic limb movement disorder     Periodic limb movement disorder    Personal history of other diseases of the circulatory system     History of cardiac murmur    Personal history of other diseases of the digestive system     History of gastritis    Personal history of other diseases of the musculoskeletal system and connective tissue     History of arthritis    Personal history of other diseases of the respiratory system     History of asthma    Type 2 diabetes mellitus without complications (Multi) 07/20/2021    Controlled diabetes mellitus      Past Surgical History:  He has a past surgical history that includes Other surgical history (12/20/2019); Other surgical history (01/16/2019); Other surgical history (01/16/2019); Inner ear surgery (08/22/2017); and Hernia repair (08/22/2017).      Social History:  He reports that he has never smoked. He has been exposed to tobacco smoke. He has never used smokeless  tobacco. Alcohol use questions deferred to the physician. He reports that he does not use drugs.    Family History:  Family History   Problem Relation Name Age of Onset    Diabetes Mother      Other (Malignant neoplasm of breast) Mother      Other (Cerebrovascular accident (CVA)) Father      Colon cancer Other Grandparent      Allergies:  Patient has no known allergies.    Outpatient Medications:  Current Outpatient Medications   Medication Instructions    albuterol 90 mcg/actuation inhaler 1-2 puffs, inhalation, Every 4 hours PRN    aspirin 81 mg, oral, Daily    atenolol (TENORMIN) 25 mg, oral, Daily    azelastine (Astelin) 137 mcg (0.1 %) nasal spray 1 spray, Each Nostril, 2 times daily    ciclopirox 1 % shampoo Wet hair and apply shampoo to scalp. Lather and leave on for 3 minutes. Rinse. Do this twice a week at night.    clobetasol (Temovate) 0.05 % external solution Topical, 2 times daily    diphenhydrAMINE (BENADRYL) 50 mg, oral, Every 6 hours PRN    ibuprofen 200 mg, oral, As needed    metoclopramide (REGLAN) 10 mg, oral, 2 times daily PRN    omeprazole (PRILOSEC) 20 mg, oral, Daily    PARoxetine (PAXIL) 20 mg, oral, Every morning    rosuvastatin (CRESTOR) 20 mg, oral, Daily    tadalafil (CIALIS) 5 mg, oral, Daily     Last Recorded Vitals:  There were no vitals filed for this visit.    Physical Exam  Patient is alert and oriented x3.  HEENT is unremarkable mucous members are moist  Neck no JVP no bruits upstrokes are full no thyromegaly  Lungs are clear bilaterally.  No wheezing crackles or rales  Heart regular rhythm normal S1-S2 there is no S3 no murmurs are heard.  Abdomen is soft bs are positive nontender nondistended no organomegaly no pulsatile masses  Extremities have no edema.  Distal pulses present palpable.  Neuro is grossly nonfocal  Skin has no rashes     Last Labs:  CBC -  Lab Results   Component Value Date    WBC 12.4 (H) 05/16/2024    HGB 13.1 (L) 05/16/2024    HCT 40.3 (L) 05/16/2024    MCV 83  05/16/2024     05/16/2024     CMP -  Lab Results   Component Value Date    CALCIUM 8.9 05/16/2024    PROT 7.2 05/16/2024    ALBUMIN 3.9 05/16/2024    AST 15 05/16/2024    ALT 10 05/16/2024    ALKPHOS 112 05/16/2024    BILITOT 0.3 05/16/2024     LIPID PANEL -   Lab Results   Component Value Date    CHOL 168 05/16/2024    HDL 36.8 05/16/2024    CHHDL 4.6 05/16/2024    VLDL 21 10/21/2023    TRIG 106 10/21/2023    NHDL 87 10/21/2023     RENAL FUNCTION PANEL -   Lab Results   Component Value Date    K 4.2 05/16/2024     Lab Results   Component Value Date    BNP 12 11/18/2022    HGBA1C 7 (A) 10/01/2024        Procedure    CAC 7/5/2023 elevated 269 units mildly dilated thoracic aorta 4 cm    ECHO [05/23/2023]: LVSF normal, est EF 55%. TA mildly dial @ 4 cm.     HOLTER [05/23/2023]: Sinus rhythm, -59. No ep/of A-fib / PSVT / high-grade AV block. No VT.     Assessment/Plan   1. Shortness of breath.  I do believe the shortness of breath is still related to weight and deconditioning.  His back really limits his activity significantly.  His echo from last year revealed normal LV function.  I have asked for him to have an exercise nuclear stress test to identify if there is any evidence of ischemia in the LAD territory.      2. CAD. Elevated calcium score 269 units located in his LAD.  He is on rosuvastatin and a baby aspirin.  I have asked for him to have an exercise nuclear stress test looking for ischemia in the anterior wall.    3.  Palpitations.  Fairly minimal at this time.    4.  Thoracic aortic aneurysm 4 cm    5.  Hyperlipidemia.  10/21/2023 LDL 66 HDL 42 triglycerides 106 blood sugar 127.  He is on rosuvastatin.  He will have repeat blood work drawn with his primary care doctor in the near future.    6.  Hypertension.  Seen in the emergency room for this.  Blood pressures are still high.  I will stop his atenolol given his underlying bradycardia and start him on amlodipine 5 mg.    EKG today.  Exercise  nuclear stress test at the [place] location.  Start amlodipine 5 mg daily stop atenolol.  Return to see me 8 to 12 weeks    Jamal Pinedo MD     Instructions and follow up

## 2024-10-11 LAB
ATRIAL RATE: 56 BPM
P AXIS: 59 DEGREES
P OFFSET: 194 MS
P ONSET: 137 MS
PR INTERVAL: 160 MS
Q ONSET: 217 MS
QRS COUNT: 9 BEATS
QRS DURATION: 92 MS
QT INTERVAL: 458 MS
QTC CALCULATION(BAZETT): 441 MS
QTC FREDERICIA: 448 MS
R AXIS: 34 DEGREES
T AXIS: 49 DEGREES
T OFFSET: 446 MS
VENTRICULAR RATE: 56 BPM

## 2024-10-17 ENCOUNTER — APPOINTMENT (OUTPATIENT)
Dept: CARDIOLOGY | Facility: CLINIC | Age: 54
End: 2024-10-17
Payer: COMMERCIAL

## 2024-10-26 DIAGNOSIS — K21.9 GASTROESOPHAGEAL REFLUX DISEASE WITHOUT ESOPHAGITIS: ICD-10-CM

## 2024-10-28 RX ORDER — OMEPRAZOLE 20 MG/1
20 CAPSULE, DELAYED RELEASE ORAL DAILY
Qty: 30 CAPSULE | Refills: 0 | Status: SHIPPED | OUTPATIENT
Start: 2024-10-28

## 2024-11-01 ENCOUNTER — APPOINTMENT (OUTPATIENT)
Dept: DERMATOLOGY | Facility: CLINIC | Age: 54
End: 2024-11-01
Payer: COMMERCIAL

## 2024-11-04 ENCOUNTER — ANESTHESIA (OUTPATIENT)
Dept: GASTROENTEROLOGY | Facility: HOSPITAL | Age: 54
End: 2024-11-04
Payer: COMMERCIAL

## 2024-11-04 ENCOUNTER — HOSPITAL ENCOUNTER (OUTPATIENT)
Dept: GASTROENTEROLOGY | Facility: HOSPITAL | Age: 54
Setting detail: OUTPATIENT SURGERY
Discharge: HOME | End: 2024-11-04
Payer: COMMERCIAL

## 2024-11-04 ENCOUNTER — ANESTHESIA EVENT (OUTPATIENT)
Dept: GASTROENTEROLOGY | Facility: HOSPITAL | Age: 54
End: 2024-11-04
Payer: COMMERCIAL

## 2024-11-04 VITALS
BODY MASS INDEX: 46.93 KG/M2 | SYSTOLIC BLOOD PRESSURE: 127 MMHG | OXYGEN SATURATION: 96 % | HEART RATE: 55 BPM | WEIGHT: 315 LBS | RESPIRATION RATE: 15 BRPM | TEMPERATURE: 96.8 F | DIASTOLIC BLOOD PRESSURE: 67 MMHG

## 2024-11-04 VITALS
HEART RATE: 59 BPM | DIASTOLIC BLOOD PRESSURE: 81 MMHG | SYSTOLIC BLOOD PRESSURE: 135 MMHG | OXYGEN SATURATION: 95 % | RESPIRATION RATE: 14 BRPM

## 2024-11-04 DIAGNOSIS — R13.19 ESOPHAGEAL DYSPHAGIA: ICD-10-CM

## 2024-11-04 DIAGNOSIS — R12 HEARTBURN: ICD-10-CM

## 2024-11-04 DIAGNOSIS — R93.3 ABNORMAL ESOPHAGRAM: Primary | ICD-10-CM

## 2024-11-04 LAB — GLUCOSE BLD MANUAL STRIP-MCNC: 138 MG/DL (ref 74–99)

## 2024-11-04 PROCEDURE — 7100000009 HC PHASE TWO TIME - INITIAL BASE CHARGE

## 2024-11-04 PROCEDURE — 82947 ASSAY GLUCOSE BLOOD QUANT: CPT

## 2024-11-04 PROCEDURE — 91010 ESOPHAGUS MOTILITY STUDY: CPT

## 2024-11-04 PROCEDURE — 2500000004 HC RX 250 GENERAL PHARMACY W/ HCPCS (ALT 636 FOR OP/ED): Performed by: NURSE ANESTHETIST, CERTIFIED REGISTERED

## 2024-11-04 PROCEDURE — A43235 PR ESOPHAGOGASTRODUODENOSCOPY TRANSORAL DIAGNOSTIC: Performed by: NURSE ANESTHETIST, CERTIFIED REGISTERED

## 2024-11-04 PROCEDURE — 3700000001 HC GENERAL ANESTHESIA TIME - INITIAL BASE CHARGE

## 2024-11-04 PROCEDURE — 7100000010 HC PHASE TWO TIME - EACH INCREMENTAL 1 MINUTE

## 2024-11-04 PROCEDURE — 43235 EGD DIAGNOSTIC BRUSH WASH: CPT | Performed by: INTERNAL MEDICINE

## 2024-11-04 PROCEDURE — A43235 PR ESOPHAGOGASTRODUODENOSCOPY TRANSORAL DIAGNOSTIC: Performed by: ANESTHESIOLOGY

## 2024-11-04 PROCEDURE — 3700000002 HC GENERAL ANESTHESIA TIME - EACH INCREMENTAL 1 MINUTE

## 2024-11-04 RX ORDER — PROPOFOL 10 MG/ML
INJECTION, EMULSION INTRAVENOUS AS NEEDED
Status: DISCONTINUED | OUTPATIENT
Start: 2024-11-04 | End: 2024-11-04

## 2024-11-04 RX ORDER — FENTANYL CITRATE 50 UG/ML
INJECTION, SOLUTION INTRAMUSCULAR; INTRAVENOUS CONTINUOUS PRN
Status: DISCONTINUED | OUTPATIENT
Start: 2024-11-04 | End: 2024-11-04

## 2024-11-04 RX ORDER — SODIUM CHLORIDE 0.9 % (FLUSH) 0.9 %
SYRINGE (ML) INJECTION AS NEEDED
Status: DISCONTINUED | OUTPATIENT
Start: 2024-11-04 | End: 2024-11-04

## 2024-11-04 RX ORDER — MIDAZOLAM HYDROCHLORIDE 1 MG/ML
INJECTION INTRAMUSCULAR; INTRAVENOUS AS NEEDED
Status: DISCONTINUED | OUTPATIENT
Start: 2024-11-04 | End: 2024-11-04

## 2024-11-04 SDOH — HEALTH STABILITY: MENTAL HEALTH: CURRENT SMOKER: 0

## 2024-11-04 ASSESSMENT — PAIN SCALES - GENERAL
PAINLEVEL_OUTOF10: 0 - NO PAIN
PAIN_LEVEL: 4
PAINLEVEL_OUTOF10: 0 - NO PAIN

## 2024-11-04 ASSESSMENT — PAIN - FUNCTIONAL ASSESSMENT
PAIN_FUNCTIONAL_ASSESSMENT: 0-10

## 2024-11-04 ASSESSMENT — COLUMBIA-SUICIDE SEVERITY RATING SCALE - C-SSRS
1. IN THE PAST MONTH, HAVE YOU WISHED YOU WERE DEAD OR WISHED YOU COULD GO TO SLEEP AND NOT WAKE UP?: NO
2. HAVE YOU ACTUALLY HAD ANY THOUGHTS OF KILLING YOURSELF?: NO
6. HAVE YOU EVER DONE ANYTHING, STARTED TO DO ANYTHING, OR PREPARED TO DO ANYTHING TO END YOUR LIFE?: NO

## 2024-11-04 NOTE — ANESTHESIA PREPROCEDURE EVALUATION
Patient: Chavo Mariscal    Procedure Information       Anesthesia Start Date/Time: 11/04/24 0745    Scheduled providers: Fazal Martinez DO; Pepe Tate MD    Procedure: EGD    Location: [facility]            Relevant Problems   Cardiac   (+) Arrhythmia   (+) CAD (coronary artery disease)   (+) Chest pain   (+) Frequent PVCs   (+) Hypercholesterolemia      Pulmonary   (+) Asthma   (+) Dyspnea on exertion   (+) Obstructive sleep apnea   (+) Shortness of breath on exertion      Neuro   (+) Anxiety      GI   (+) Rectal hemorrhage      Endocrine   (+) Morbid obesity (Multi)      Musculoskeletal   (+) Degenerative joint disease (DJD) of lumbar spine       Clinical information reviewed:    Allergies  Meds               NPO Detail:  NPO/Void Status  Date of Last Liquid: 11/03/24  Time of Last Liquid: 2200  Date of Last Solid: 11/03/24  Time of Last Solid: 1800         Physical Exam    Airway  Mallampati: I  TM distance: >3 FB  Neck ROM: full     Cardiovascular - normal exam     Dental - normal exam     Pulmonary - normal exam     Abdominal - normal exam         Anesthesia Plan    History of general anesthesia?: yes  History of complications of general anesthesia?: no    ASA 2     MAC     The patient is not a current smoker.  Patient was not previously instructed to abstain from smoking on day of procedure.  Patient did not smoke on day of procedure.    intravenous induction   Postoperative administration of opioids is intended.  Anesthetic plan and risks discussed with patient.  Use of blood products discussed with patient who.    Plan discussed with CAA.

## 2024-11-04 NOTE — ANESTHESIA POSTPROCEDURE EVALUATION
Patient: Chavo Mariscal    Procedure Summary       Date: 11/04/24 Room / Location: [facility]    Anesthesia Start: 0745 Anesthesia Stop: 0804    Procedure: EGD Diagnosis:       Esophageal dysphagia      Heartburn      Abnormal esophagram    Scheduled Providers: Fazal Martinez DO; Pepe Tate MD Responsible Provider: Pepe Tate MD    Anesthesia Type: MAC ASA Status: 2            Anesthesia Type: MAC    Vitals Value Taken Time   /68 11/04/24 0803   Temp 36.7 11/04/24 0810   Pulse 64 11/04/24 0803   Resp 16 11/04/24 0803   SpO2 97 % 11/04/24 0803       Anesthesia Post Evaluation    Patient location during evaluation: PACU  Patient participation: complete - patient participated  Level of consciousness: awake and alert  Pain score: 4  Pain management: adequate  Airway patency: patent  Cardiovascular status: acceptable  Respiratory status: acceptable  Hydration status: acceptable  Postoperative Nausea and Vomiting: none      No notable events documented.

## 2024-11-04 NOTE — DISCHARGE INSTRUCTIONS

## 2024-11-18 ENCOUNTER — HOSPITAL ENCOUNTER (OUTPATIENT)
Dept: CARDIOLOGY | Facility: HOSPITAL | Age: 54
Discharge: HOME | End: 2024-11-18
Payer: COMMERCIAL

## 2024-11-18 ENCOUNTER — HOSPITAL ENCOUNTER (OUTPATIENT)
Dept: RADIOLOGY | Facility: HOSPITAL | Age: 54
Discharge: HOME | End: 2024-11-18
Payer: COMMERCIAL

## 2024-11-18 DIAGNOSIS — I25.10 CORONARY ARTERY DISEASE INVOLVING NATIVE CORONARY ARTERY OF NATIVE HEART WITHOUT ANGINA PECTORIS: Chronic | ICD-10-CM

## 2024-11-18 DIAGNOSIS — R06.09 DYSPNEA ON EXERTION: ICD-10-CM

## 2024-11-18 PROCEDURE — 93018 CV STRESS TEST I&R ONLY: CPT | Performed by: INTERNAL MEDICINE

## 2024-11-18 PROCEDURE — 93016 CV STRESS TEST SUPVJ ONLY: CPT | Performed by: INTERNAL MEDICINE

## 2024-11-18 PROCEDURE — 78452 HT MUSCLE IMAGE SPECT MULT: CPT

## 2024-11-18 PROCEDURE — A9502 TC99M TETROFOSMIN: HCPCS | Performed by: INTERNAL MEDICINE

## 2024-11-18 PROCEDURE — 3430000001 HC RX 343 DIAGNOSTIC RADIOPHARMACEUTICALS: Performed by: INTERNAL MEDICINE

## 2024-11-18 PROCEDURE — 93017 CV STRESS TEST TRACING ONLY: CPT

## 2024-11-19 ENCOUNTER — HOSPITAL ENCOUNTER (OUTPATIENT)
Dept: RADIOLOGY | Facility: HOSPITAL | Age: 54
Discharge: HOME | End: 2024-11-19
Payer: COMMERCIAL

## 2024-11-19 PROCEDURE — 3430000001 HC RX 343 DIAGNOSTIC RADIOPHARMACEUTICALS: Performed by: INTERNAL MEDICINE

## 2024-11-19 PROCEDURE — A9502 TC99M TETROFOSMIN: HCPCS | Performed by: INTERNAL MEDICINE

## 2024-11-26 DIAGNOSIS — R35.0 URINARY FREQUENCY: Primary | ICD-10-CM

## 2024-11-27 RX ORDER — TADALAFIL 5 MG/1
5 TABLET ORAL DAILY
Qty: 30 TABLET | Refills: 2 | Status: SHIPPED | OUTPATIENT
Start: 2024-11-27

## 2024-12-04 ENCOUNTER — APPOINTMENT (OUTPATIENT)
Dept: PRIMARY CARE | Facility: CLINIC | Age: 54
End: 2024-12-04
Payer: COMMERCIAL

## 2024-12-05 ENCOUNTER — APPOINTMENT (OUTPATIENT)
Dept: DERMATOLOGY | Facility: CLINIC | Age: 54
End: 2024-12-05
Payer: COMMERCIAL

## 2024-12-05 DIAGNOSIS — L21.9 SEBORRHEIC DERMATITIS: ICD-10-CM

## 2024-12-05 DIAGNOSIS — D48.5 NEOPLASM OF UNCERTAIN BEHAVIOR OF SKIN: Primary | ICD-10-CM

## 2024-12-05 PROCEDURE — 3051F HG A1C>EQUAL 7.0%<8.0%: CPT | Performed by: STUDENT IN AN ORGANIZED HEALTH CARE EDUCATION/TRAINING PROGRAM

## 2024-12-05 PROCEDURE — 99204 OFFICE O/P NEW MOD 45 MIN: CPT | Performed by: STUDENT IN AN ORGANIZED HEALTH CARE EDUCATION/TRAINING PROGRAM

## 2024-12-05 PROCEDURE — 11104 PUNCH BX SKIN SINGLE LESION: CPT | Performed by: STUDENT IN AN ORGANIZED HEALTH CARE EDUCATION/TRAINING PROGRAM

## 2024-12-05 PROCEDURE — 3062F POS MACROALBUMINURIA REV: CPT | Performed by: STUDENT IN AN ORGANIZED HEALTH CARE EDUCATION/TRAINING PROGRAM

## 2024-12-05 RX ORDER — KETOCONAZOLE 20 MG/ML
SHAMPOO, SUSPENSION TOPICAL DAILY
Qty: 120 ML | Refills: 11 | Status: SHIPPED | OUTPATIENT
Start: 2024-12-05

## 2024-12-05 NOTE — PROGRESS NOTES
Subjective     Chavo Mariscal is a 53 y.o. male who presents for the following: Dermatitis (Patient has been experiencing symptoms of seborrheic dermatitis for over a year. In the past he has treated with Ciclopirox shampoo and clobetasol solution.).     Review of Systems:  No other skin or systemic complaints other than what is documented elsewhere in the note.    The following portions of the chart were reviewed this encounter and updated as appropriate:          Skin Cancer History  No skin cancer on file.      Specialty Problems    None       Objective   Well appearing patient in no apparent distress; mood and affect are within normal limits.    A focused skin examination was performed. All findings within normal limits unless otherwise noted below.    Assessment/Plan   1. Neoplasm of uncertain behavior of skin  Mid Parietal Scalp  Diffuse erythema with areas of scale          Lesion biopsy  Type of biopsy: punch    Informed consent: discussed and consent obtained    Timeout: patient name, date of birth, surgical site, and procedure verified    Procedure prep:  Patient was prepped and draped  Anesthesia: the lesion was anesthetized in a standard fashion    Anesthetic:  1% lidocaine w/ epinephrine 1-100,000 local infiltration  Punch size:  4 mm  Suture size:  4-0  Suture type: Prolene (polypropylene)    Suture removal (days):  14  Hemostasis achieved with: suture    Outcome: patient tolerated procedure well    Post-procedure details: sterile dressing applied and wound care instructions given    Dressing type: bandage and petrolatum      Staff Communication: Dermatology Local Anesthesia: 1 % Lidocaine / Epinephrine - Amount: 3 ml    Specimen 1 - Dermatopathology- DERM LAB  Differential Diagnosis: seb derm vs dm vs lch vs other  Check Margins Yes/No?:    Comments:  seb derm recalcitrant to treatment  Dermpath Lab: Routine Histopathology (formalin-fixed tissue)    Biopsy to aid in diangosis    2. Seborrheic  dermatitis  Scalp  Diffuse erythema with focal areas of thick scale    Not responsive to ciclopirox shampoo   Minimally affected by topical clobetasol    Biopsy today to r/o DM, LCH, other  Start ketoconazole 2% shampoo daily, leave on 5 minutes before rinsing  Clobetasol change to bid. M-F, weekends off. Side effects of steroids reviewed        ketoconazole (NIZOral) 2 % shampoo - Scalp  Apply topically once daily. Leave on scalp 5 minutes before rinsing    Related Medications  ciclopirox 1 % shampoo  Wet hair and apply shampoo to scalp. Lather and leave on for 3 minutes. Rinse. Do this twice a week at night.

## 2024-12-08 DIAGNOSIS — F41.0 PANIC ATTACK: ICD-10-CM

## 2024-12-08 DIAGNOSIS — K21.9 GASTROESOPHAGEAL REFLUX DISEASE WITHOUT ESOPHAGITIS: ICD-10-CM

## 2024-12-09 RX ORDER — PAROXETINE HYDROCHLORIDE 20 MG/1
20 TABLET, FILM COATED ORAL
Qty: 90 TABLET | Refills: 0 | Status: SHIPPED | OUTPATIENT
Start: 2024-12-09 | End: 2025-12-09

## 2024-12-09 RX ORDER — OMEPRAZOLE 20 MG/1
20 CAPSULE, DELAYED RELEASE ORAL DAILY
Qty: 90 CAPSULE | Refills: 0 | Status: SHIPPED | OUTPATIENT
Start: 2024-12-09 | End: 2025-12-09

## 2024-12-13 NOTE — RESULT ENCOUNTER NOTE
Dr. Tinoco spoke with patient regarding Mid Parietal Scalp punch biopsy results: Evolving Cicatricial Alopecia. Dr. Tinoco asked patient to monitor for hair loss and contact him if symptoms worsen.

## 2025-02-02 DIAGNOSIS — E78.00 HYPERCHOLESTEROLEMIA: ICD-10-CM

## 2025-02-03 DIAGNOSIS — F41.0 PANIC ATTACK: ICD-10-CM

## 2025-02-03 DIAGNOSIS — R21 RASH: ICD-10-CM

## 2025-02-03 RX ORDER — CLOBETASOL PROPIONATE 0.5 MG/ML
SOLUTION TOPICAL
Qty: 50 ML | Refills: 0 | Status: SHIPPED | OUTPATIENT
Start: 2025-02-03 | End: 2025-02-03 | Stop reason: SDUPTHER

## 2025-02-03 RX ORDER — ROSUVASTATIN CALCIUM 20 MG/1
20 TABLET, COATED ORAL DAILY
Qty: 90 TABLET | Refills: 0 | Status: SHIPPED | OUTPATIENT
Start: 2025-02-03

## 2025-02-03 RX ORDER — PAROXETINE HYDROCHLORIDE 20 MG/1
TABLET, FILM COATED ORAL
Qty: 90 TABLET | Refills: 0 | Status: SHIPPED | OUTPATIENT
Start: 2025-02-03

## 2025-02-03 RX ORDER — CLOBETASOL PROPIONATE 0.5 MG/ML
SOLUTION TOPICAL 2 TIMES DAILY
Qty: 50 ML | Refills: 11 | Status: SHIPPED | OUTPATIENT
Start: 2025-02-03

## 2025-02-04 ENCOUNTER — APPOINTMENT (OUTPATIENT)
Dept: PRIMARY CARE | Facility: CLINIC | Age: 55
End: 2025-02-04
Payer: COMMERCIAL

## 2025-02-20 ENCOUNTER — OFFICE VISIT (OUTPATIENT)
Dept: URGENT CARE | Age: 55
End: 2025-02-20
Payer: COMMERCIAL

## 2025-02-20 VITALS
DIASTOLIC BLOOD PRESSURE: 82 MMHG | OXYGEN SATURATION: 95 % | TEMPERATURE: 98.5 F | WEIGHT: 315 LBS | SYSTOLIC BLOOD PRESSURE: 137 MMHG | BODY MASS INDEX: 44.1 KG/M2 | HEART RATE: 80 BPM | HEIGHT: 71 IN | RESPIRATION RATE: 18 BRPM

## 2025-02-20 DIAGNOSIS — J45.20 INTERMITTENT ASTHMA WITHOUT COMPLICATION, UNSPECIFIED ASTHMA SEVERITY (HHS-HCC): Primary | ICD-10-CM

## 2025-02-20 DIAGNOSIS — R05.9 COUGH, UNSPECIFIED TYPE: ICD-10-CM

## 2025-02-20 DIAGNOSIS — H66.91 RIGHT OTITIS MEDIA, UNSPECIFIED OTITIS MEDIA TYPE: ICD-10-CM

## 2025-02-20 LAB
POC RAPID INFLUENZA A: NEGATIVE
POC RAPID INFLUENZA B: NEGATIVE

## 2025-02-20 RX ORDER — PREDNISONE 10 MG/1
TABLET ORAL
Qty: 20 TABLET | Refills: 0 | Status: SHIPPED | OUTPATIENT
Start: 2025-02-20 | End: 2025-02-25

## 2025-02-20 RX ORDER — AMOXICILLIN AND CLAVULANATE POTASSIUM 875; 125 MG/1; MG/1
875 TABLET, FILM COATED ORAL 2 TIMES DAILY
Qty: 20 TABLET | Refills: 0 | Status: SHIPPED | OUTPATIENT
Start: 2025-02-20 | End: 2025-03-02

## 2025-02-20 ASSESSMENT — ENCOUNTER SYMPTOMS
COUGH: 1
SHORTNESS OF BREATH: 1
CONSTITUTIONAL NEGATIVE: 1
SINUS PAIN: 0
RHINORRHEA: 1
WHEEZING: 1

## 2025-02-20 ASSESSMENT — PATIENT HEALTH QUESTIONNAIRE - PHQ9
2. FEELING DOWN, DEPRESSED OR HOPELESS: NOT AT ALL
SUM OF ALL RESPONSES TO PHQ9 QUESTIONS 1 AND 2: 0
1. LITTLE INTEREST OR PLEASURE IN DOING THINGS: NOT AT ALL

## 2025-02-20 ASSESSMENT — PAIN SCALES - GENERAL: PAINLEVEL_OUTOF10: 5

## 2025-02-20 NOTE — PROGRESS NOTES
Subjective   Patient ID: Chavo Mariscal is a 54 y.o. male. They present today with a chief complaint of Cough, Nausea, and Generalized Body Aches (X 4 days).    History of Present Illness    Cough  Associated symptoms include ear pain, rhinorrhea, shortness of breath and wheezing.       Past Medical History  Allergies as of 02/20/2025    (No Known Allergies)       (Not in a hospital admission)       Past Medical History:   Diagnosis Date    Arrhythmia 09/25/2023    CAD (coronary artery disease) 09/24/2023 7/5/2023: Elevated  Agatston units (all in LAD)      Diabetes mellitus, new onset 09/24/2023    Hypercholesterolemia 09/24/2023    Hypertension     Mixed conductive and sensorineural hearing loss, unspecified     Mixed hearing loss    Morbid (severe) obesity due to excess calories (Multi)     Severe obesity (BMI 35.0-35.9 with comorbidity)    Obstructive sleep apnea 07/10/2007    Obstructive sleep apnea (adult) (pediatric)     Obstructive sleep apnea, adult    Periodic limb movement disorder     Periodic limb movement disorder    Personal history of other diseases of the circulatory system     History of cardiac murmur    Personal history of other diseases of the digestive system     History of gastritis    Personal history of other diseases of the musculoskeletal system and connective tissue     History of arthritis    Personal history of other diseases of the respiratory system     History of asthma    Type 2 diabetes mellitus without complications (Multi) 07/20/2021    Controlled diabetes mellitus       Past Surgical History:   Procedure Laterality Date    HERNIA REPAIR  08/22/2017    Hernia Repair    INNER EAR SURGERY  08/22/2017    Inner Ear Surgery    OTHER SURGICAL HISTORY  12/20/2019    Tonsillectomy    OTHER SURGICAL HISTORY  01/16/2019    Colonoscopy    OTHER SURGICAL HISTORY  01/16/2019    Esophagogastroduodenoscopy        reports that he has never smoked. He has been exposed to tobacco smoke.  "He has never used smokeless tobacco. Alcohol use questions deferred to the physician. He reports that he does not use drugs.    Review of Systems  Review of Systems   Constitutional: Negative.    HENT:  Positive for congestion, ear pain and rhinorrhea. Negative for sinus pain.    Respiratory:  Positive for cough, shortness of breath and wheezing.                                   Objective    Vitals:    02/20/25 0816   BP: 137/82   Pulse: 80   Resp: 18   Temp: 36.9 °C (98.5 °F)   SpO2: 95%   Weight: (!) 153 kg (338 lb)   Height: 1.803 m (5' 11\")     No LMP for male patient.    Physical Exam  Constitutional:       Appearance: Normal appearance.   HENT:      Right Ear: Tympanic membrane is erythematous.      Nose: Congestion present.   Eyes:      Conjunctiva/sclera: Conjunctivae normal.   Cardiovascular:      Rate and Rhythm: Normal rate and regular rhythm.   Pulmonary:      Effort: Pulmonary effort is normal.      Breath sounds: Examination of the right-upper field reveals decreased breath sounds. Examination of the left-upper field reveals decreased breath sounds. Examination of the right-middle field reveals decreased breath sounds. Examination of the left-middle field reveals decreased breath sounds. Examination of the right-lower field reveals decreased breath sounds. Examination of the left-lower field reveals decreased breath sounds. Decreased breath sounds present.   Neurological:      Mental Status: He is alert.         Procedures    Point of Care Test & Imaging Results from this visit  Results for orders placed or performed in visit on 02/20/25   POCT Influenza A/B manually resulted   Result Value Ref Range    POC Rapid Influenza A Negative Negative    POC Rapid Influenza B Negative Negative      No results found.    Diagnostic study results (if any) were reviewed by Marsha Candelario MD.    Assessment/Plan   Allergies, medications, history, and pertinent labs/EKGs/Imaging reviewed by Marsha Candelario MD. "     Medical Decision Making      Orders and Diagnoses  Diagnoses and all orders for this visit:  Cough, unspecified type  -     POCT Influenza A/B manually resulted      Medical Admin Record      Patient disposition: Home    Electronically signed by Marsha Candelario MD  8:29 AM

## 2025-02-24 ENCOUNTER — APPOINTMENT (OUTPATIENT)
Dept: PRIMARY CARE | Facility: CLINIC | Age: 55
End: 2025-02-24
Payer: COMMERCIAL

## 2025-02-24 VITALS
HEIGHT: 71 IN | DIASTOLIC BLOOD PRESSURE: 63 MMHG | TEMPERATURE: 98.2 F | OXYGEN SATURATION: 94 % | HEART RATE: 54 BPM | BODY MASS INDEX: 44.1 KG/M2 | SYSTOLIC BLOOD PRESSURE: 118 MMHG | WEIGHT: 315 LBS

## 2025-02-24 DIAGNOSIS — J06.9 ACUTE URI: Primary | ICD-10-CM

## 2025-02-24 DIAGNOSIS — E66.01 OBESITY, MORBID, BMI 40.0-49.9 (MULTI): ICD-10-CM

## 2025-02-24 DIAGNOSIS — E11.9 TYPE 2 DIABETES MELLITUS WITHOUT COMPLICATION, WITHOUT LONG-TERM CURRENT USE OF INSULIN (MULTI): ICD-10-CM

## 2025-02-24 PROCEDURE — 3074F SYST BP LT 130 MM HG: CPT | Performed by: INTERNAL MEDICINE

## 2025-02-24 PROCEDURE — 3008F BODY MASS INDEX DOCD: CPT | Performed by: INTERNAL MEDICINE

## 2025-02-24 PROCEDURE — 3078F DIAST BP <80 MM HG: CPT | Performed by: INTERNAL MEDICINE

## 2025-02-24 PROCEDURE — 99214 OFFICE O/P EST MOD 30 MIN: CPT | Performed by: INTERNAL MEDICINE

## 2025-02-24 PROCEDURE — 1036F TOBACCO NON-USER: CPT | Performed by: INTERNAL MEDICINE

## 2025-02-24 RX ORDER — PROMETHAZINE HYDROCHLORIDE AND DEXTROMETHORPHAN HYDROBROMIDE 6.25; 15 MG/5ML; MG/5ML
5 SYRUP ORAL 4 TIMES DAILY
Qty: 200 ML | Refills: 1 | Status: SHIPPED | OUTPATIENT
Start: 2025-02-24 | End: 2025-03-06

## 2025-02-24 ASSESSMENT — COLUMBIA-SUICIDE SEVERITY RATING SCALE - C-SSRS
1. IN THE PAST MONTH, HAVE YOU WISHED YOU WERE DEAD OR WISHED YOU COULD GO TO SLEEP AND NOT WAKE UP?: NO
6. HAVE YOU EVER DONE ANYTHING, STARTED TO DO ANYTHING, OR PREPARED TO DO ANYTHING TO END YOUR LIFE?: NO
2. HAVE YOU ACTUALLY HAD ANY THOUGHTS OF KILLING YOURSELF?: NO

## 2025-02-24 NOTE — PATIENT INSTRUCTIONS
Plan   Urgi care note reviewed  Current medications are effective. advised to continue current medications.  Started on new med for cough , congestion .   BTW note from last Monday to today and going back tomorrow  F/U in October for physical or as needed

## 2025-02-24 NOTE — LETTER
February 24, 2025     Patient: Chavo Mariscal   YOB: 1970   Date of Visit: 2/24/2025       To Whom It May Concern:    Chavo Mariscal was seen in my clinic on 2/24/2025 at 3:00 pm. Please excuse Chavo for his absence from work on this day to make the appointment. Please excuse patient from 2/17/2025-  2/24/2025 , patient will return back to work on 2/25/2025. If you have any questions, please contact our office.     Sincerely,         Ulises Vogel MD        CC: No Recipients

## 2025-02-24 NOTE — PROGRESS NOTES
"My nurse note reviewed. Patient is here for:  Follow-up (6 Month FUV and medication refills )   Here with his wife   He was at [facility] few days ago for URI symptoms . Flu swab was neg. Was given z jose d and prednisone which he is taking . Getting somewhat better except still has congestion and cough . No high fever but occ has low gr fever.   Non smoker   Has morbid obesity . Had questions related to ozympic med . Discussed about it. He will let me know after discussing with his insurance.   Has not gone to work for a week and missed his work at [employer] yesterday, needs a note for it.     OBJECTIVE :  /63 (BP Location: Right arm, Patient Position: Sitting, BP Cuff Size: Large adult)   Pulse 54   Temp 36.8 °C (98.2 °F)   Ht 1.803 m (5' 11\")   Wt (!) 156 kg (344 lb)   SpO2 94%   BMI 47.98 kg/m²   M,orbidly obese  Vitals noted  not in acute distress  conj pink, no sinus tenderness. Ears: no discharge or redness noted. Both TM looked OK. No cervical LN enlargement . Throat: mild erythema noted , no exudate.   Lungs clear. Heart S1 S2 +, no S3    Lab Results   Component Value Date    HGBA1C 7 (A) 10/01/2024       Assessment:  1. Acute URI  promethazine-DM (Phenergan-DM) 6.25-15 mg/5 mL syrup      2. Type 2 diabetes mellitus without complication, without long-term current use of insulin (Multi)  Ok control. Will monitor it.       3. Obesity, morbid, BMI 40.0-49.9 (Multi)  Discussed med. Questions answered          Plan   Urgi care note reviewed  Increase oral fluids   Current medications are effective. advised to continue current medications.  Started on new med for cough , congestion .   BTW note from last Monday to today and going back tomorrow  F/U in October for physical or as needed   "

## 2025-02-26 ENCOUNTER — APPOINTMENT (OUTPATIENT)
Dept: CARDIOLOGY | Facility: CLINIC | Age: 55
End: 2025-02-26
Payer: COMMERCIAL

## 2025-03-04 DIAGNOSIS — R05.1 ACUTE COUGH: Primary | ICD-10-CM

## 2025-03-05 ENCOUNTER — HOSPITAL ENCOUNTER (OUTPATIENT)
Dept: RADIOLOGY | Facility: HOSPITAL | Age: 55
Discharge: HOME | End: 2025-03-05
Payer: COMMERCIAL

## 2025-03-05 DIAGNOSIS — K21.9 GASTROESOPHAGEAL REFLUX DISEASE WITHOUT ESOPHAGITIS: ICD-10-CM

## 2025-03-05 DIAGNOSIS — R05.1 ACUTE COUGH: ICD-10-CM

## 2025-03-05 PROCEDURE — 71046 X-RAY EXAM CHEST 2 VIEWS: CPT

## 2025-03-05 RX ORDER — OMEPRAZOLE 20 MG/1
20 CAPSULE, DELAYED RELEASE ORAL DAILY
Qty: 90 CAPSULE | Refills: 0 | Status: SHIPPED | OUTPATIENT
Start: 2025-03-05

## 2025-03-10 DIAGNOSIS — R35.0 URINARY FREQUENCY: Primary | ICD-10-CM

## 2025-03-10 RX ORDER — TADALAFIL 5 MG/1
5 TABLET ORAL DAILY
Qty: 30 TABLET | Refills: 2 | Status: SHIPPED | OUTPATIENT
Start: 2025-03-10

## 2025-03-18 ENCOUNTER — APPOINTMENT (OUTPATIENT)
Dept: UROLOGY | Facility: HOSPITAL | Age: 55
End: 2025-03-18
Payer: COMMERCIAL

## 2025-03-20 ENCOUNTER — APPOINTMENT (OUTPATIENT)
Dept: AUDIOLOGY | Facility: CLINIC | Age: 55
End: 2025-03-20
Payer: COMMERCIAL

## 2025-03-20 ENCOUNTER — APPOINTMENT (OUTPATIENT)
Dept: OTOLARYNGOLOGY | Facility: CLINIC | Age: 55
End: 2025-03-20
Payer: COMMERCIAL

## 2025-03-24 NOTE — PROGRESS NOTES
Subjective   Patient ID: Chavo Mariscal is a 54 y.o. male    HPI  54 y.o. male who presenting for cystoscopic evaluation with urinary frequency and incomplete bladder emptying. He reports needing to urinate frequently, approximately every half hour after drinking water. He also experiences occasional burning during urination. The patient has a history of using a CPAP machine, which has reduced nocturnal urination. Additionally, he reports issues with erectile dysfunction.    Today, he denies any urinary symptoms such as urgency, frequency, hematuria, pain, burning, and infections. He reports complete bladder emptying.         Review of Systems    All systems were reviewed. Anything negative was noted in the HPI.    Objective   Physical Exam    General: Well developed, well nourished, alert and cooperative, appears in no acute distress   Eyes: Non-injected conjunctiva, sclera clear, no proptosis   Cardiac: Extremities are warm and well perfused. No edema, cyanosis or pallor   Lungs: Breathing is easy, non-labored. Speaking in clear and complete sentences. Normal diaphragmatic movement   MSK: Ambulatory with steady gait, unassisted   Neuro: Alert and oriented to person, place, and time   Psych: Demonstrates good judgment and reason, without hallucinations, abnormal affect or abnormal behaviors   Skin: No obvious lesions, no rashes       No CVA tenderness bilaterally   No suprapubic pain or discomfort       Past Medical History:   Diagnosis Date    Arrhythmia 09/25/2023    CAD (coronary artery disease) 09/24/2023 7/5/2023: Elevated  Agatston units (all in LAD)      Diabetes mellitus, new onset 09/24/2023    Hypercholesterolemia 09/24/2023    Hypertension     Mixed conductive and sensorineural hearing loss, unspecified     Mixed hearing loss    Morbid (severe) obesity due to excess calories (Multi)     Severe obesity (BMI 35.0-35.9 with comorbidity)    Obstructive sleep apnea 07/10/2007    Obstructive sleep  apnea (adult) (pediatric)     Obstructive sleep apnea, adult    Periodic limb movement disorder     Periodic limb movement disorder    Personal history of other diseases of the circulatory system     History of cardiac murmur    Personal history of other diseases of the digestive system     History of gastritis    Personal history of other diseases of the musculoskeletal system and connective tissue     History of arthritis    Personal history of other diseases of the respiratory system     History of asthma    Type 2 diabetes mellitus without complications (Multi) 07/20/2021    Controlled diabetes mellitus         Past Surgical History:   Procedure Laterality Date    HERNIA REPAIR  08/22/2017    Hernia Repair    INNER EAR SURGERY  08/22/2017    Inner Ear Surgery    OTHER SURGICAL HISTORY  12/20/2019    Tonsillectomy    OTHER SURGICAL HISTORY  01/16/2019    Colonoscopy    OTHER SURGICAL HISTORY  01/16/2019    Esophagogastroduodenoscopy     Procedure:  The patient was prepped using a Betadine solution. Lidocaine jelly was instilled into the urethra. The flexible cystoscope was sterilely inserted into the urethra and formal cystoscopy performed in a systematic fashion. For detailed findings of the procedure, please see Dr. Burnham’s remarks below  Scope A used, Cipro 500 mg p.o. given    Assessment/Plan   Cystoscopic evaluation Benign Prostatic Hyperplasia (BPH), Erectile Dysfunction (ED), moderate bulbar urethral stricture that is getting worse on cysto     54 y.o. male who presents for the above condition, Today, we had a very long and extensive discussion with the patient regarding the pathophysiology, differential diagnosis, risk factor, associated condition, diagnostic work-up and management of BPH and lower urinary tract symptoms and acute urinary retention. I discussed with the patient the need to check his PSA to assess his prostate cancer risk. We discussed at length the mechanism of action, risk, benefit,  adverse events and side effect of alpha-blocker in the form of tamsulosin 0.4 mg p.o nightly. We discussed in particular the risk of hypotension, lightheadedness, dizziness, and the risk of fall and bone fracture. Also discussed retrograde ejaculation of the side effects of the medication. We had another discussion with the patient regarding lifestyle modifications including low fluid intake after 5 PM, timed voiding every 2 hours, and decrease caffeine intake. I discussed with the patient that in case he had an elevated PSA, we will proceed do an MRI of the prostate.      We had a very long and extensive discussion with the patient regarding the pathophysiology, differential diagnosis, risk factor, and management of ED. We discussed at length mechanism of action, risk, benefit, potential complication, adverse events of PDE 5 inhibitors in the form of Tadalafil 5 mg/day and 10 mg/ as needed as needed. Instructed the patient to stop the medication in case of any side effects.    Plan:  - Referred to Dr. Nagy to discuss reconstructive options for his strictures         E&M visit today is associated with current or anticipated ongoing medical care services related to a patient's single, serious condition or a complex condition.     3/25/2025    Scribe Attestation  By signing my name below, I, Savi Brannon attest that this documentation has been prepared under the direction and in the presence of Dr. Sterling Burnham.

## 2025-03-25 ENCOUNTER — PROCEDURE VISIT (OUTPATIENT)
Dept: UROLOGY | Facility: HOSPITAL | Age: 55
End: 2025-03-25
Payer: COMMERCIAL

## 2025-03-25 VITALS — SYSTOLIC BLOOD PRESSURE: 131 MMHG | DIASTOLIC BLOOD PRESSURE: 71 MMHG | HEART RATE: 57 BPM

## 2025-03-25 DIAGNOSIS — R39.9 LOWER URINARY TRACT SYMPTOMS (LUTS): Primary | ICD-10-CM

## 2025-03-25 PROCEDURE — 52000 CYSTOURETHROSCOPY: CPT | Performed by: STUDENT IN AN ORGANIZED HEALTH CARE EDUCATION/TRAINING PROGRAM

## 2025-03-25 PROCEDURE — 99214 OFFICE O/P EST MOD 30 MIN: CPT | Performed by: STUDENT IN AN ORGANIZED HEALTH CARE EDUCATION/TRAINING PROGRAM

## 2025-03-25 RX ORDER — CIPROFLOXACIN 500 MG/1
500 TABLET ORAL ONCE
Status: SHIPPED | OUTPATIENT
Start: 2025-03-25

## 2025-03-25 NOTE — PROGRESS NOTES
Patient ID: Chavo Mariscal is a 54 y.o. male.    Cystoscopy    Date/Time: 3/25/2025 11:54 AM    Performed by: Sterling Burnham MD MPH  Authorized by: Sterling Burnham MD MPH    Procedure - Bladder Cystoscopy:     Procedure details: cystoscopy  PREOPERATIVE DIAGNOSIS:  LUTS, urethral strciture     POSTOPERATIVE DIAGNOSIS:  Same     OPERATION:  Flexible Cystourethroscopy        SURGEON:  Sterling Burnham MD MPH     ANESTHESIA:  2%  lidocaine jelly     COMPLICATIONS:  None     EBL: Minimal        DISPOSITION:  The patient was discharged home after the procedure, per routine.     INDICATIONS: :  Mr. Mariscal is a 53 y.o. patient with a history of LUts who presents today for Cystoscopy.      The indications, risks and benefits of this procedure were discussed with the patient, consent was obtained prior to the procedure, and to the best of my judgement the patient seemed to understand and agree to the procedure.     PROCEDURE:  The patient  was brought into the procedure suite and informed consent was reviewed and confirmed. Vital signs were obtained prior to the procedure: There were no vitals taken for this visit..  The patient was escorted onto the stretcher, placed supine, prepped with betadine and draped in the usual standard surgical fashion.  Intraurethral 2% viscous lidocaine jelly was used for local analgesia.  A 16 [place] flexible cystourethroscope was inserted into the urethra.   The penile urethra was normal. Multiple Bulbar urethral strictures getting worse compared to the first cysto.  The prostate urethra was enlarged.  Upon entering the bladder the entire bladder was surveyed in a 360 degree fashion.  The left and right ureteral orifices were in normal orthotopic position effluxing clear yellow urine, bilaterally.   There was no evidence of any bladder lesions, foreign objects, stones or evidence of any mucosal changes. The cystoscope was then retroflexed.  The bladder neck was then further examined  without any evidence of lesions. The scope was then removed and in an antegrade fashion, the urethra and bladder were again resurveyed with no evidence of additional lesions.  The cystoscope was then fully removed.   The patient tolerated the procedure well.  Vitals were stable after the procedure.  The patient was able to void and was discharged home.  Verbal and written Post procedure instructions were reviewed with the patient.     IMPRESSION:  Urethral strictures bulbar, getting worse      PLAN:  Refer to Dr. Nagy

## 2025-04-01 ENCOUNTER — APPOINTMENT (OUTPATIENT)
Dept: UROLOGY | Facility: HOSPITAL | Age: 55
End: 2025-04-01
Payer: COMMERCIAL

## 2025-04-07 ENCOUNTER — APPOINTMENT (OUTPATIENT)
Dept: UROLOGY | Facility: CLINIC | Age: 55
End: 2025-04-07
Payer: COMMERCIAL

## 2025-04-07 VITALS — HEART RATE: 85 BPM | SYSTOLIC BLOOD PRESSURE: 131 MMHG | DIASTOLIC BLOOD PRESSURE: 81 MMHG

## 2025-04-07 DIAGNOSIS — R35.0 URINARY FREQUENCY: Primary | ICD-10-CM

## 2025-04-07 DIAGNOSIS — N35.919 STRICTURE OF MALE URETHRA, UNSPECIFIED STRICTURE TYPE: ICD-10-CM

## 2025-04-07 PROCEDURE — 3075F SYST BP GE 130 - 139MM HG: CPT | Performed by: UROLOGY

## 2025-04-07 PROCEDURE — 3079F DIAST BP 80-89 MM HG: CPT | Performed by: UROLOGY

## 2025-04-07 PROCEDURE — 99203 OFFICE O/P NEW LOW 30 MIN: CPT | Performed by: UROLOGY

## 2025-04-07 RX ORDER — ACETAMINOPHEN 325 MG/1
975 TABLET ORAL ONCE
OUTPATIENT
Start: 2025-04-07 | End: 2025-04-07

## 2025-04-07 RX ORDER — CELECOXIB 400 MG/1
400 CAPSULE ORAL ONCE
OUTPATIENT
Start: 2025-04-07 | End: 2025-04-07

## 2025-04-07 NOTE — PROGRESS NOTES
UROLOGIC INITIAL EVALUATION     PROBLEM LIST:  1. Urinary frequency  Urine Culture    Case Request Operating Room: CYSTOSCOPY, WITH URETHRAL DILATION    Place in outpatient/hospital ambulatory surgery    Full code    Vital Signs    Pulse oximetry, spot    NPO Diet Except: Sips with meds; Effective now    Height and weight    Insert and maintain peripheral IV    Saline lock IV    POCT Glucose    Type And Screen    Inpatient consult to Respiratory Care    Skin prep (specify)    Discharge instructions    celecoxib (CeleBREX) capsule 400 mg    acetaminophen (Tylenol) tablet 975 mg    Request for Pre-Admission Testing Visit    Case Request Operating Room: CYSTOSCOPY, WITH URETHRAL DILATION      2. Stricture of male urethra, unspecified stricture type  Case Request Operating Room: CYSTOSCOPY, WITH URETHRAL DILATION    Place in outpatient/hospital ambulatory surgery    Full code    Vital Signs    Pulse oximetry, spot    NPO Diet Except: Sips with meds; Effective now    Height and weight    Insert and maintain peripheral IV    Saline lock IV    POCT Glucose    Type And Screen    Inpatient consult to Respiratory Care    Skin prep (specify)    Discharge instructions    celecoxib (CeleBREX) capsule 400 mg    acetaminophen (Tylenol) tablet 975 mg    Request for Pre-Admission Testing Visit    Case Request Operating Room: CYSTOSCOPY, WITH URETHRAL DILATION           HISTORY OF PRESENT ILLNESS:   Chavo Mariscal is a 54 y.o. M h/o asthma (uses inhaler BID), GERD, HLD, obesity, DM, SOPHIE, HTN referred by Dr Burnham for bulbar urethral strictures with LUTS. Per chart review, serial cystoscopies showed worsening strictures. Patient brings video of cystoscopy with him which demonstrates circumferential mild narrowing of the bulbar and proximal penile urethra.     Subjectively, he complains of incomplete void, straining, and ED. Has not noticed improvement on tamsulosin for LUTS or ED. He does not get erections. Symptoms have not  worsened despite reports of worsening strictures on cystoscopy. Pain with urination is very infrequent. Denies gross hematuria.     PAST MEDICAL HISTORY:  Past Medical History:   Diagnosis Date    Arrhythmia 09/25/2023    CAD (coronary artery disease) 09/24/2023 7/5/2023: Elevated  Agatston units (all in LAD)      Diabetes mellitus, new onset 09/24/2023    Hypercholesterolemia 09/24/2023    Hypertension     Mixed conductive and sensorineural hearing loss, unspecified     Mixed hearing loss    Morbid (severe) obesity due to excess calories (Multi)     Severe obesity (BMI 35.0-35.9 with comorbidity)    Obstructive sleep apnea 07/10/2007    Obstructive sleep apnea (adult) (pediatric)     Obstructive sleep apnea, adult    Periodic limb movement disorder     Periodic limb movement disorder    Personal history of other diseases of the circulatory system     History of cardiac murmur    Personal history of other diseases of the digestive system     History of gastritis    Personal history of other diseases of the musculoskeletal system and connective tissue     History of arthritis    Personal history of other diseases of the respiratory system     History of asthma    Type 2 diabetes mellitus without complications (Multi) 07/20/2021    Controlled diabetes mellitus       PAST SURGICAL HISTORY:  Past Surgical History:   Procedure Laterality Date    HERNIA REPAIR  08/22/2017    Hernia Repair    INNER EAR SURGERY  08/22/2017    Inner Ear Surgery    OTHER SURGICAL HISTORY  12/20/2019    Tonsillectomy    OTHER SURGICAL HISTORY  01/16/2019    Colonoscopy    OTHER SURGICAL HISTORY  01/16/2019    Esophagogastroduodenoscopy        ALLERGIES:   No Known Allergies     MEDICATIONS:   Current Outpatient Medications on File Prior to Visit   Medication Sig Dispense Refill    albuterol 90 mcg/actuation inhaler Inhale 1-2 puffs every 4 hours if needed for wheezing. 18 g 3    amLODIPine (Norvasc) 5 mg tablet Take 1 tablet (5 mg)  by mouth once daily. 30 tablet 11    ciclopirox 1 % shampoo Wet hair and apply shampoo to scalp. Lather and leave on for 3 minutes. Rinse. Do this twice a week at night. 120 mL 11    clobetasol (Temovate) 0.05 % external solution Apply topically 2 times a day. As needed for rash on scalp 50 mL 11    ibuprofen 200 mg tablet Take 1 tablet (200 mg) by mouth if needed (back pain).      ketoconazole (NIZOral) 2 % shampoo Apply topically once daily. Leave on scalp 5 minutes before rinsing 120 mL 11    omeprazole (PriLOSEC) 20 mg DR capsule TAKE ONE CAPSULE BY MOUTH DAILY. DO NOT CRUSH OR CHEW. 90 capsule 0    PARoxetine (Paxil) 20 mg tablet TAKE ONE TABLET BY MOUTH ONCE DAILY IN THE MORNING BEFORE MEALS 90 tablet 0    rosuvastatin (Crestor) 20 mg tablet TAKE ONE TABLET BY MOUTH EVERY DAY 90 tablet 0    tadalafil (Cialis) 5 mg tablet Take 1 tablet (5 mg) by mouth once daily. 30 tablet 2    diphenhydrAMINE (BENADryl) 50 mg capsule Take 1 capsule (50 mg) by mouth every 6 hours if needed for itching for up to 10 days. 20 capsule 0    metoclopramide (Reglan) 10 mg tablet Take 1 tablet (10 mg) by mouth 2 times a day as needed (headache) for up to 10 days. 20 tablet 0     Current Facility-Administered Medications on File Prior to Visit   Medication Dose Route Frequency Provider Last Rate Last Admin    ciprofloxacin (Cipro) tablet 500 mg  500 mg oral Once Sterling Burnham MD M[facility]        ciprofloxacin (Cipro) tablet 500 mg  500 mg oral Once Sterling Burnham MD M[facility]            SOCIAL HISTORY:  Patient  reports that he has never smoked. He has been exposed to tobacco smoke. He has never used smokeless tobacco. Alcohol use questions deferred to the physician. He reports that he does not use drugs.   Social History     Socioeconomic History    Marital status:      Spouse name: Not on file    Number of children: Not on file    Years of education: Not on file    Highest education level: Not on file   Occupational History    Not  on file   Tobacco Use    Smoking status: Never     Passive exposure: Past    Smokeless tobacco: Never   Vaping Use    Vaping status: Never Used   Substance and Sexual Activity    Alcohol use: Defer     Comment: sometimes some beer    Drug use: Never    Sexual activity: Defer   Other Topics Concern    Not on file   Social History Narrative    Not on file     Social Drivers of Health     Financial Resource Strain: Not on file   Food Insecurity: No Food Insecurity (3/7/2024)    Hunger Vital Sign     Worried About Running Out of Food in the Last Year: Never true     Ran Out of Food in the Last Year: Never true   Transportation Needs: Not on file   Physical Activity: Not on file   Stress: Not on file   Social Connections: Not on file   Intimate Partner Violence: Not on file   Housing Stability: Not on file       FAMILY HISTORY:  Family History   Problem Relation Name Age of Onset    Diabetes Mother      Other (Malignant neoplasm of breast) Mother      Other (Cerebrovascular accident (CVA)) Father      Colon cancer Other Grandparent        REVIEW OF SYSTEMS:  Negative except as reported above    PHYSICAL EXAM:  Visit Vitals  /81   Pulse 85     Constitutional: Well-developed and well-nourished. No distress.    Head: Normocephalic and atraumatic.    Neck: Normal range of motion.     Pulmonary/Chest: Effort normal. No respiratory distress.   Abdominal: Non-distended.  Integumentary: No rash or lesions visualized.  Musculoskeletal: Normal range of motion.    Neurological: Alert and oriented.  Psychiatric: Normal mood and affect. Thought content normal.      LABORATORY REVIEW:   Lab Results   Component Value Date    BUN 22 05/16/2024    CREATININE 0.78 05/16/2024    EGFR >90 05/16/2024     05/16/2024    K 4.2 05/16/2024     05/16/2024    CO2 24 05/16/2024    CALCIUM 8.9 05/16/2024      Lab Results   Component Value Date    WBC 12.4 (H) 05/16/2024    RBC 4.87 05/16/2024    HGB 13.1 (L) 05/16/2024    HCT 40.3  (L) 05/16/2024    MCV 83 05/16/2024    MCH 26.9 05/16/2024    MCHC 32.5 05/16/2024    RDW 13.8 05/16/2024     05/16/2024        Lab Results   Component Value Date    PSA 0.41 05/16/2024    PSA 0.44 07/29/2021    PSA 0.36 03/19/2019           Assessment:      1. Urinary frequency  Urine Culture    Case Request Operating Room: CYSTOSCOPY, WITH URETHRAL DILATION    Place in outpatient/hospital ambulatory surgery    Full code    Vital Signs    Pulse oximetry, spot    NPO Diet Except: Sips with meds; Effective now    Height and weight    Insert and maintain peripheral IV    Saline lock IV    POCT Glucose    Type And Screen    Inpatient consult to Respiratory Care    Skin prep (specify)    Discharge instructions    celecoxib (CeleBREX) capsule 400 mg    acetaminophen (Tylenol) tablet 975 mg    Request for Pre-Admission Testing Visit    Case Request Operating Room: CYSTOSCOPY, WITH URETHRAL DILATION      2. Stricture of male urethra, unspecified stricture type  Case Request Operating Room: CYSTOSCOPY, WITH URETHRAL DILATION    Place in outpatient/hospital ambulatory surgery    Full code    Vital Signs    Pulse oximetry, spot    NPO Diet Except: Sips with meds; Effective now    Height and weight    Insert and maintain peripheral IV    Saline lock IV    POCT Glucose    Type And Screen    Inpatient consult to Respiratory Care    Skin prep (specify)    Discharge instructions    celecoxib (CeleBREX) capsule 400 mg    acetaminophen (Tylenol) tablet 975 mg    Request for Pre-Admission Testing Visit    Case Request Operating Room: CYSTOSCOPY, WITH URETHRAL DILATION          Chavo Mariscal is a 54 y.o.  M h/o asthma (uses inhaler BID), GERD, HLD, obesity, DM, SOPHIE, HTN referred by Dr Burnham for bulbar urethral strictures with LUTS. Per chart review, serial cystoscopies showed worsening strictures a/w obstructive voiding symptoms.      Plan:   - The patient is very averse to aggressive intervention. We had a discussion about  the natural sequelae of urethral stricture and the risks of acute retention, UTI, and renal damage secondary to chronic high pressure voiding. It is advisable that we treat the strictures either with Optilume or urethroplasty before he ends up in a more acute situation. He states he prefers a less invasive approach in the form of optilume with the understanding that this may not work long-term and require urethroplasty down the line. He understands that if Optilume fails we likely would not offer him another dilation unless he is no longer a surgical candidate. We will have him see PAT prior to surgery

## 2025-04-11 ENCOUNTER — PRE-ADMISSION TESTING (OUTPATIENT)
Dept: PREADMISSION TESTING | Facility: HOSPITAL | Age: 55
End: 2025-04-11
Payer: COMMERCIAL

## 2025-04-11 ENCOUNTER — TELEPHONE (OUTPATIENT)
Dept: CARDIOLOGY | Facility: CLINIC | Age: 55
End: 2025-04-11

## 2025-04-11 VITALS
BODY MASS INDEX: 44.1 KG/M2 | SYSTOLIC BLOOD PRESSURE: 146 MMHG | HEART RATE: 60 BPM | WEIGHT: 315 LBS | HEIGHT: 71 IN | DIASTOLIC BLOOD PRESSURE: 74 MMHG | RESPIRATION RATE: 16 BRPM | TEMPERATURE: 96.1 F | OXYGEN SATURATION: 95 %

## 2025-04-11 DIAGNOSIS — Z01.818 PREOP TESTING: Primary | ICD-10-CM

## 2025-04-11 LAB
ANION GAP SERPL CALC-SCNC: 10 MMOL/L (ref 10–20)
APPEARANCE UR: CLEAR
BACTERIA UR CULT: NORMAL
BILIRUB UR STRIP.AUTO-MCNC: NEGATIVE MG/DL
BUN SERPL-MCNC: 19 MG/DL (ref 6–23)
CALCIUM SERPL-MCNC: 9.1 MG/DL (ref 8.6–10.3)
CHLORIDE SERPL-SCNC: 105 MMOL/L (ref 98–107)
CO2 SERPL-SCNC: 27 MMOL/L (ref 21–32)
COLOR UR: NORMAL
CREAT SERPL-MCNC: 0.7 MG/DL (ref 0.5–1.3)
EGFRCR SERPLBLD CKD-EPI 2021: >90 ML/MIN/1.73M*2
ERYTHROCYTE [DISTWIDTH] IN BLOOD BY AUTOMATED COUNT: 13.6 % (ref 11.5–14.5)
GLUCOSE SERPL-MCNC: 187 MG/DL (ref 74–99)
GLUCOSE UR STRIP.AUTO-MCNC: NORMAL MG/DL
HCT VFR BLD AUTO: 42.4 % (ref 41–52)
HGB BLD-MCNC: 13.1 G/DL (ref 13.5–17.5)
HOLD SPECIMEN: NORMAL
KETONES UR STRIP.AUTO-MCNC: NEGATIVE MG/DL
LEUKOCYTE ESTERASE UR QL STRIP.AUTO: NEGATIVE
MCH RBC QN AUTO: 26.5 PG (ref 26–34)
MCHC RBC AUTO-ENTMCNC: 30.9 G/DL (ref 32–36)
MCV RBC AUTO: 86 FL (ref 80–100)
NITRITE UR QL STRIP.AUTO: NEGATIVE
NRBC BLD-RTO: 0 /100 WBCS (ref 0–0)
PH UR STRIP.AUTO: 6.5 [PH]
PLATELET # BLD AUTO: 227 X10*3/UL (ref 150–450)
POTASSIUM SERPL-SCNC: 4.1 MMOL/L (ref 3.5–5.3)
PROT UR STRIP.AUTO-MCNC: NEGATIVE MG/DL
QUEST TRACKING HOUSE ACCOUNT: NORMAL
RBC # BLD AUTO: 4.95 X10*6/UL (ref 4.5–5.9)
RBC # UR STRIP.AUTO: NEGATIVE MG/DL
SODIUM SERPL-SCNC: 138 MMOL/L (ref 136–145)
SP GR UR STRIP.AUTO: 1.02
UROBILINOGEN UR STRIP.AUTO-MCNC: NORMAL MG/DL
WBC # BLD AUTO: 9.7 X10*3/UL (ref 4.4–11.3)

## 2025-04-11 PROCEDURE — 36415 COLL VENOUS BLD VENIPUNCTURE: CPT

## 2025-04-11 PROCEDURE — 99204 OFFICE O/P NEW MOD 45 MIN: CPT

## 2025-04-11 PROCEDURE — 85027 COMPLETE CBC AUTOMATED: CPT

## 2025-04-11 PROCEDURE — 81003 URINALYSIS AUTO W/O SCOPE: CPT

## 2025-04-11 PROCEDURE — 80048 BASIC METABOLIC PNL TOTAL CA: CPT

## 2025-04-11 RX ORDER — ACETAMINOPHEN 650 MG/1
SUPPOSITORY RECTAL
COMMUNITY
End: 2025-04-16 | Stop reason: WASHOUT

## 2025-04-11 RX ORDER — ASPIRIN 81 MG/1
81 TABLET ORAL DAILY
COMMUNITY

## 2025-04-11 ASSESSMENT — DUKE ACTIVITY SCORE INDEX (DASI)
DASI METS SCORE: 8
CAN YOU DO HEAVY WORK AROUND THE HOUSE LIKE SCRUBBING FLOORS OR LIFTING AND MOVING HEAVY FURNITURE: YES
CAN YOU HAVE SEXUAL RELATIONS: YES
CAN YOU DO MODERATE WORK AROUND THE HOUSE LIKE VACUUMING, SWEEPING FLOORS OR CARRYING GROCERIES: YES
CAN YOU DO YARD WORK LIKE RAKING LEAVES, WEEDING OR PUSHING A MOWER: YES
CAN YOU PARTICIPATE IN STRENOUS SPORTS LIKE SWIMMING, SINGLES TENNIS, FOOTBALL, BASKETBALL, OR SKIING: NO
CAN YOU WALK INDOORS, SUCH AS AROUND YOUR HOUSE: YES
CAN YOU CLIMB A FLIGHT OF STAIRS OR WALK UP A HILL: YES
CAN YOU DO LIGHT WORK AROUND THE HOUSE LIKE DUSTING OR WASHING DISHES: YES
TOTAL_SCORE: 42.7
CAN YOU WALK A BLOCK OR TWO ON LEVEL GROUND: YES
CAN YOU TAKE CARE OF YOURSELF (EAT, DRESS, BATHE, OR USE TOILET): YES
CAN YOU PARTICIPATE IN MODERATE RECREATIONAL ACTIVITIES LIKE GOLF, BOWLING, DANCING, DOUBLES TENNIS OR THROWING A BASEBALL OR FOOTBALL: YES
CAN YOU RUN A SHORT DISTANCE: NO

## 2025-04-11 NOTE — H&P (VIEW-ONLY)
"CPM/PAT Evaluation       Name: Chavo Mariscal (Chavo Mariscal)  /Age: 1970/54 y.o.     Visit Type:   In-Person       Chief Complaint: Urinary frequency and issues voiding requiring intervention    HPI  Patient is a 54-year-old male with a BMI of 49 and a history of HTN, HLD, CAD, thoracic aneurysm, obesity, asthma, SOPHIE on CPAP, newly diagnosed diabetes, GERD, and urethral stricture presents today for preoperative evaluation.  Patient follows with Dr. Nagy for urinary frequency and stricture of his urethra.  He is scheduled for cystoscopy with urethral dilation Optilume on 2025.      Patient denies recent fever, chills, fatigue, chest pain or gastrointestinal issues.    Endorses a productive cough with greenish sputum for the past 7 to 8 weeks.  He did see his PCP in March for this -chest x-ray was negative for any acute process.  Denies any chest congestion.  Reports his cough is improved slightly but still lingering.  He was taking Mucinex and increased inhaler use initially at he send stop the Nasonex and wean down his inhalers.    Endorses intermittent feeling of \"missed heartbeat\" which causes a feeling of flushed sensation with elevated blood pressure and increased anxiety.    Endorses intermittent \"twinge\" of discomfort with starting urination and \"spurting\" of urinary stream    Past Medical History:   Diagnosis Date    Anxiety     Arrhythmia 2023    Asthma     CAD (coronary artery disease) 2023: Elevated  Agatston units (all in LAD)      Diabetes mellitus, new onset 2023    GERD (gastroesophageal reflux disease)     Hypercholesterolemia 2023    Hypertension     Mixed conductive and sensorineural hearing loss, unspecified     Mixed hearing loss    Morbid (severe) obesity due to excess calories (Multi)     Severe obesity (BMI 35.0-35.9 with comorbidity)    Obstructive sleep apnea 07/10/2007    Obstructive sleep apnea (adult) (pediatric)     Obstructive " sleep apnea, adult    Periodic limb movement disorder     Periodic limb movement disorder    Personal history of other diseases of the circulatory system     History of cardiac murmur    Personal history of other diseases of the digestive system     History of gastritis    Personal history of other diseases of the musculoskeletal system and connective tissue     History of arthritis    Personal history of other diseases of the respiratory system     History of asthma    Type 2 diabetes mellitus without complications 07/20/2021    Controlled diabetes mellitus       Past Surgical History:   Procedure Laterality Date    HERNIA REPAIR  08/22/2017    Hernia Repair    INNER EAR SURGERY  08/22/2017    Inner Ear Surgery    OTHER SURGICAL HISTORY  12/20/2019    Tonsillectomy    OTHER SURGICAL HISTORY  01/16/2019    Colonoscopy    OTHER SURGICAL HISTORY  01/16/2019    Esophagogastroduodenoscopy       Patient Sexual activity questions deferred to the physician.    Family History   Problem Relation Name Age of Onset    Diabetes Mother      Other (Malignant neoplasm of breast) Mother      Other (Cerebrovascular accident (CVA)) Father      Colon cancer Other Grandparent        No Known Allergies    Prior to Admission medications    Medication Sig Start Date End Date Taking? Authorizing Provider   acetaminophen (Tylenol) 650 mg suppository Insert into the rectum.   Yes Historical Provider, MD   albuterol 90 mcg/actuation inhaler Inhale 1-2 puffs every 4 hours if needed for wheezing. 10/1/24  Yes Ulises Vogel MD   amLODIPine (Norvasc) 5 mg tablet Take 1 tablet (5 mg) by mouth once daily. 10/10/24 10/10/25 Yes Jamal Pinedo MD   aspirin 81 mg EC tablet Take 1 tablet (81 mg) by mouth once daily.   Yes Historical Provider, MD   omeprazole (PriLOSEC) 20 mg DR capsule TAKE ONE CAPSULE BY MOUTH DAILY. DO NOT CRUSH OR CHEW. 3/5/25  Yes Ulises Vogel MD   PARoxetine (Paxil) 20 mg tablet TAKE ONE TABLET BY MOUTH ONCE DAILY IN THE  MORNING BEFORE MEALS 2/3/25  Yes Ulises Vogel MD   rosuvastatin (Crestor) 20 mg tablet TAKE ONE TABLET BY MOUTH EVERY DAY 2/3/25  Yes Ulises Vogel MD   tadalafil (Cialis) 5 mg tablet Take 1 tablet (5 mg) by mouth once daily. 3/10/25  Yes Sterling Burnham MD [facility]   ciclopirox 1 % shampoo Wet hair and apply shampoo to scalp. Lather and leave on for 3 minutes. Rinse. Do this twice a week at night. 1/29/24   Elana Rocah PA-C   clobetasol (Temovate) 0.05 % external solution Apply topically 2 times a day. As needed for rash on scalp 2/3/25   Lu Tinoco MD   diphenhydrAMINE (BENADryl) 50 mg capsule Take 1 capsule (50 mg) by mouth every 6 hours if needed for itching for up to 10 days. 9/9/24 10/10/24  LAURA Jordan   ketoconazole (NIZOral) 2 % shampoo Apply topically once daily. Leave on scalp 5 minutes before rinsing 12/5/24   Lu Tinoco MD   metoclopramide (Reglan) 10 mg tablet Take 1 tablet (10 mg) by mouth 2 times a day as needed (headache) for up to 10 days. 9/9/24 10/10/24  LAURA Jordan   ibuprofen 200 mg tablet Take 1 tablet (200 mg) by mouth if needed (back pain).  4/11/25  Historical Provider, MD        A ten-point review of systems was completed and is otherwise negative except for what is mentioned in the HPI above.    Physical Exam:    GENERAL: Well developed, obese, awake/alert/oriented x3, no distress, alert and cooperative  HEENT: MMM  NECK: Trachea midline, no lymphadenopathy  CARDIOVASCULAR: RRR, normal S1 and S 2, no murmurs, 2+ equal pulses of the extremities  RESPIRATORY: Patent airways, CTAB, normal breath sounds with good chest expansion, thorax symmetric  ABDOMEN: Soft, non-tender, no distention  SKIN: Warm and dry  EXTREMITIES: No cyanosis, edema  NEURO: A&O x 3, normal motor and sensation, no focal deficits   PSYCH: Appropriate mood and behavior      PAT AIRWAY:   Airway:     Mallampati::  I    TM distance::  >3 FB    Neck ROM::  Full  normal   "      Visit Vitals  /74   Pulse 60   Temp 35.6 °C (96.1 °F) (Temporal)   Resp 16   Ht 1.803 m (5' 11\")   Wt (!) 159 kg (350 lb 8.5 oz)   SpO2 95%   BMI 48.89 kg/m²   Smoking Status Never   BSA 2.82 m²       DASI Risk Score      Flowsheet Row [facility]Admission [facility]   Can you take care of yourself (eat, dress, bathe, or use toilet)?  2.75 filed at 04/11/2025 0822   Can you walk indoors, such as around your house? 1.75 filed at 04/11/2025 0822   Can you walk a block or two on level ground?  2.75 filed at 04/11/2025 0822   Can you climb a flight of stairs or walk up a hill? 5.5 filed at 04/11/2025 0822   Can you run a short distance? 0 filed at 04/11/2025 0822   Can you do light work around the house like dusting or washing dishes? 2.7 filed at 04/11/2025 0822   Can you do moderate work around the house like vacuuming, sweeping floors or carrying groceries? 3.5 filed at 04/11/2025 0822   Can you do heavy work around the house like scrubbing floors or lifting and moving heavy furniture?  8 filed at 04/11/2025 0822   Can you do yard work like raking leaves, weeding or pushing a mower? 4.5 filed at 04/11/2025 0822   Can you have sexual relations? 5.25 filed at 04/11/2025 0822   Can you participate in moderate recreational activities like golf, bowling, dancing, doubles tennis or throwing a baseball or football? 6 filed at 04/11/2025 0822   Can you participate in strenous sports like swimming, singles tennis, football, basketball, or skiing? 0 filed at 04/11/2025 0822   DASI SCORE 42.7 filed at 04/11/2025 0822   METS Score (Will be calculated only when all the questions are answered) 8 filed at 04/11/2025 0822          Caprini DVT Assessment    No data to display       Modified Frailty Index    No data to display       [id number] Stroke Risk Points  Current as of just now        N/A 0 to 9 Points:      Last Change: N/A          The [id number] risk score (Lip GH, et al. " 2009. © 2010 [employer]) quantifies the risk of stroke for a patient with atrial fibrillation. For patients without atrial fibrillation or under the age of 18 this score appears as N/A. Higher score values generally indicate higher risk of stroke.        This score is not applicable to this patient. Components are not calculated.          Revised Cardiac Risk Index    No data to display       Apfel Simplified Score    No data to display       Risk Analysis Index Results This Encounter    No data found in the last 10 encounters.       Prodigy: High Risk  Total Score: 8              Prodigy Gender Score          ARISCAT Score for Postoperative Pulmonary Complications      Flowsheet Row Pre-Admission Testing from 4/11/2025 in [facility]   Age Calculated Score 3 filed at 04/11/2025 0905   Preoperative SpO2 8 filed at 04/11/2025 0905   Respiratory infection in the last month Either upper or lower (i.e., URI, bronchitis, pneumonia), with fever and antibiotic treatment 0 filed at 04/11/2025 0905   Preoperative anemia (Hgb less than 10 g/dl) 0 filed at 04/11/2025 0905   Surgical incision  0 filed at 04/11/2025 0905   Duration of surgery  0 filed at 04/11/2025 0905   Emergency Procedure  0 filed at 04/11/2025 0905   ARISCAT Total Score  11 filed at 04/11/2025 0905          Lilo Perioperative Risk for Myocardial Infarction or Cardiac Arrest (CLIFFORD)      Flowsheet Row Pre-Admission Testing from 4/11/2025 in [facility]   Calculated Age Score 1.12 filed at 04/11/2025 0906   Functional Status  0 filed at 04/11/2025 0906   ASA Class  -3.29 filed at 04/11/2025 0906   Creatinine 0 filed at 04/11/2025 0906   Type of Procedure  -0.26 filed at 04/11/2025 0906   CLIFFORD Total Score  -7.68 filed at 04/11/2025 0906   CLIFFORD % 0.05 filed at 04/11/2025 0906            Assessment and Plan:   Patient is a 54-year-old male with a BMI of 49 and a history of HTN, HLD, CAD, thoracic aneurysm,  obesity, asthma, SOPHIE on CPAP, newly diagnosed diabetes, GERD, and urethral stricture.    Follows with PCP Dr. Vogel -last seen in office on 2/24/2025 for URI symptoms    #Urinary frequency   #Stricture of his urethra  He is scheduled for cystoscopy with urethral dilation Optilume on 4/17/2025 with Dr. Nagy  Takes tadalafil daily for symptoms with stricture, although he states it did not change his symptoms   >advised to hold tadalafil for 3 days prior to surgery    #HTN, Hx  #HLD, Hx  #CAD, Hx  #Thoracic aneurysm, Hx  -Follows with Dr. Pinedo-last seen 10/10/2024  -Nuclear stress test from 11/19/2024    1. Positive stress EKG for ischemia > baseline EKG at rest showed sinus bradycardia with normal tracing   2. Below average functional capacity for age.   3. No exercise associated cardiac symptoms were noted.   4. Marked hypertensive blood pressure response with exercise     >Probable normal exercise Myoview stress test with some degree of  attenuation artifact in the anterior wall. A small prior anterior  wall infarction without residual rodo-infarct ischemia can not be  excluded but is less likely in view of normal contractility.     >Normal segmental function of the gated images with an exercise  ejection fraction of 62 %.  -Thoracic aorta measuring 4 cm on echocardiogram from 5/23/2023  -BP currently maintained on amlodipine > patient states is well-controlled unless he has PVCs that he feels flushed and finds his blood pressure to be 200s over 100s  -HDL controlled with baby aspirin and rosuvastatin  -Lipid panel from 5/16/2024 shows good control  - Patient is scheduled to follow-up with Dr. Pinedo on 4/16/2025 > advised patient to keep this appointment and discuss surgery on 4/17 with Dr. Pinedo  Cardiac clearance requested from Dr. Pinedo given the results of recent stress test and hypertension (clearance forms faxed to office by [facility] [place])    #Asthma, Hx  #SOPHIE on CPAP, Hx  #URI starting in February  -Uses CPAP  routinely  -Utilizes albuterol rescue inhaler 2 times weekly which is down from 2-3 times daily during height of recent URI  -Patient did have some upper airway congestion initially on exam with wheezes auscultated. He was able to cough and clear his airway with lungs clear to auscultation afterwards. POX 95%  -He denies fever, chills, nasal congestion or fatigue. States his cough and inhaler requirements have improved slowly. No changes in his sputum color (green) or consistency  -CXR from 3/5/25 was negative for acute process  -Advised patient to reach out to his PCP regarding continued productive cough. If symptoms worsen, advised to contact PCP and notify Dr. Nagy of illness  -ARISCAT score 11 given recent URI and POX of 95%    #Newly diagnosed diabetes  HA1C in May 2024 was up to 7.3  Patient attempting diet and exercise to control glucose levels with moderate results  HA1C on 10/1/2024 down to 7  Not currently on any medications    #GERD, Hx  Currently maintained on omeprazole with good control    #Anxiety, Hx  Currently maintained on paroxetine with good control    #Seborrheic dermatitis of the scalp, Hx  Follows with dermatology-last seen in December 2024  Using ketoconazole and clobetasol    Labs obtained today and pending (BMP, CBC, UA)    Cardiac clearance requested from Dr. Pinedo given the results of recent stress test and hypertension    I spent 45 minutes in the professional and overall care of this patient. Greater than 50% of this time was spent counseling patient, reviewing plan of care and discussing medication perioperative management.    Maia Cavanaugh, APRN-CNP

## 2025-04-11 NOTE — CPM/PAT H&P
"CPM/PAT Evaluation       Name: Chavo Mariscal (Chavo Mariscal)  /Age: 1970/54 y.o.     Visit Type:   In-Person       Chief Complaint: Urinary frequency and issues voiding requiring intervention    HPI  Patient is a 54-year-old male with a BMI of 49 and a history of HTN, HLD, CAD, thoracic aneurysm, obesity, asthma, SOPHIE on CPAP, newly diagnosed diabetes, GERD, and urethral stricture presents today for preoperative evaluation.  Patient follows with Dr. Nagy for urinary frequency and stricture of his urethra.  He is scheduled for cystoscopy with urethral dilation Optilume on 2025.      Patient denies recent fever, chills, fatigue, chest pain or gastrointestinal issues.    Endorses a productive cough with greenish sputum for the past 7 to 8 weeks.  He did see his PCP in March for this -chest x-ray was negative for any acute process.  Denies any chest congestion.  Reports his cough is improved slightly but still lingering.  He was taking Mucinex and increased inhaler use initially at he send stop the Nasonex and wean down his inhalers.    Endorses intermittent feeling of \"missed heartbeat\" which causes a feeling of flushed sensation with elevated blood pressure and increased anxiety.    Endorses intermittent \"twinge\" of discomfort with starting urination and \"spurting\" of urinary stream    Past Medical History:   Diagnosis Date    Anxiety     Arrhythmia 2023    Asthma     CAD (coronary artery disease) 2023: Elevated  Agatston units (all in LAD)      Diabetes mellitus, new onset 2023    GERD (gastroesophageal reflux disease)     Hypercholesterolemia 2023    Hypertension     Mixed conductive and sensorineural hearing loss, unspecified     Mixed hearing loss    Morbid (severe) obesity due to excess calories (Multi)     Severe obesity (BMI 35.0-35.9 with comorbidity)    Obstructive sleep apnea 07/10/2007    Obstructive sleep apnea (adult) (pediatric)     Obstructive " sleep apnea, adult    Periodic limb movement disorder     Periodic limb movement disorder    Personal history of other diseases of the circulatory system     History of cardiac murmur    Personal history of other diseases of the digestive system     History of gastritis    Personal history of other diseases of the musculoskeletal system and connective tissue     History of arthritis    Personal history of other diseases of the respiratory system     History of asthma    Type 2 diabetes mellitus without complications 07/20/2021    Controlled diabetes mellitus       Past Surgical History:   Procedure Laterality Date    HERNIA REPAIR  08/22/2017    Hernia Repair    INNER EAR SURGERY  08/22/2017    Inner Ear Surgery    OTHER SURGICAL HISTORY  12/20/2019    Tonsillectomy    OTHER SURGICAL HISTORY  01/16/2019    Colonoscopy    OTHER SURGICAL HISTORY  01/16/2019    Esophagogastroduodenoscopy       Patient Sexual activity questions deferred to the physician.    Family History   Problem Relation Name Age of Onset    Diabetes Mother      Other (Malignant neoplasm of breast) Mother      Other (Cerebrovascular accident (CVA)) Father      Colon cancer Other Grandparent        No Known Allergies    Prior to Admission medications    Medication Sig Start Date End Date Taking? Authorizing Provider   acetaminophen (Tylenol) 650 mg suppository Insert into the rectum.   Yes Historical Provider, MD   albuterol 90 mcg/actuation inhaler Inhale 1-2 puffs every 4 hours if needed for wheezing. 10/1/24  Yes Ulises Vogel MD   amLODIPine (Norvasc) 5 mg tablet Take 1 tablet (5 mg) by mouth once daily. 10/10/24 10/10/25 Yes Jamal Pinedo MD   aspirin 81 mg EC tablet Take 1 tablet (81 mg) by mouth once daily.   Yes Historical Provider, MD   omeprazole (PriLOSEC) 20 mg DR capsule TAKE ONE CAPSULE BY MOUTH DAILY. DO NOT CRUSH OR CHEW. 3/5/25  Yes Ulises Vogel MD   PARoxetine (Paxil) 20 mg tablet TAKE ONE TABLET BY MOUTH ONCE DAILY IN THE  MORNING BEFORE MEALS 2/3/25  Yes Ulises Vogel MD   rosuvastatin (Crestor) 20 mg tablet TAKE ONE TABLET BY MOUTH EVERY DAY 2/3/25  Yes Ulises Vogel MD   tadalafil (Cialis) 5 mg tablet Take 1 tablet (5 mg) by mouth once daily. 3/10/25  Yes Sterling Burnham MD [facility]   ciclopirox 1 % shampoo Wet hair and apply shampoo to scalp. Lather and leave on for 3 minutes. Rinse. Do this twice a week at night. 1/29/24   Elana Rocha PA-C   clobetasol (Temovate) 0.05 % external solution Apply topically 2 times a day. As needed for rash on scalp 2/3/25   Lu Tinoco MD   diphenhydrAMINE (BENADryl) 50 mg capsule Take 1 capsule (50 mg) by mouth every 6 hours if needed for itching for up to 10 days. 9/9/24 10/10/24  LAURA Jordan   ketoconazole (NIZOral) 2 % shampoo Apply topically once daily. Leave on scalp 5 minutes before rinsing 12/5/24   Lu Tinoco MD   metoclopramide (Reglan) 10 mg tablet Take 1 tablet (10 mg) by mouth 2 times a day as needed (headache) for up to 10 days. 9/9/24 10/10/24  LAURA Jordan   ibuprofen 200 mg tablet Take 1 tablet (200 mg) by mouth if needed (back pain).  4/11/25  Historical Provider, MD        A ten-point review of systems was completed and is otherwise negative except for what is mentioned in the HPI above.    Physical Exam:    GENERAL: Well developed, obese, awake/alert/oriented x3, no distress, alert and cooperative  HEENT: MMM  NECK: Trachea midline, no lymphadenopathy  CARDIOVASCULAR: RRR, normal S1 and S 2, no murmurs, 2+ equal pulses of the extremities  RESPIRATORY: Patent airways, CTAB, normal breath sounds with good chest expansion, thorax symmetric  ABDOMEN: Soft, non-tender, no distention  SKIN: Warm and dry  EXTREMITIES: No cyanosis, edema  NEURO: A&O x 3, normal motor and sensation, no focal deficits   PSYCH: Appropriate mood and behavior      PAT AIRWAY:   Airway:     Mallampati::  I    TM distance::  >3 FB    Neck ROM::  Full  normal   "      Visit Vitals  /74   Pulse 60   Temp 35.6 °C (96.1 °F) (Temporal)   Resp 16   Ht 1.803 m (5' 11\")   Wt (!) 159 kg (350 lb 8.5 oz)   SpO2 95%   BMI 48.89 kg/m²   Smoking Status Never   BSA 2.82 m²       DASI Risk Score      Flowsheet Row Pre-Admission Testing [facility]11/2025 in [facility]   Can you take care of yourself (eat, dress, bathe, or use toilet)?  2.75 filed at 04/11/2025 0822   Can you walk indoors, such as around your house? 1.75 filed at 04/11/2025 0822   Can you walk a block or two on level ground?  2.75 filed at 04/11/2025 0822   Can you climb a flight of stairs or walk up a hill? 5.5 filed at 04/11/2025 0822   Can you run a short distance? 0 filed at 04/11/2025 0822   Can you do light work around the house like dusting or washing dishes? 2.7 filed at 04/11/2025 0822   Can you do moderate work around the house like vacuuming, sweeping floors or carrying groceries? 3.5 filed at 04/11/2025 0822   Can you do heavy work around the house like scrubbing floors or lifting and moving heavy furniture?  8 filed at 04/11/2025 0822   Can you do yard work like raking leaves, weeding or pushing a mower? 4.5 filed at 04/11/2025 0822   Can you have sexual relations? 5.25 filed at 04/11/2025 0822   Can you participate in moderate recreational activities like golf, bowling, dancing, doubles tennis or throwing a baseball or football? 6 filed at 04/11/2025 0822   Can you participate in strenous sports like swimming, singles tennis, football, basketball, or skiing? 0 filed at 04/11/2025 0822   DASI SCORE 42.7 filed at 04/11/2025 0822   METS Score (Will be calculated only when all the questions are answered) 8 filed at 04/11/2025 0822          Caprini DVT Assessment    No data to display       Modified Frailty Index    No data to display       [id number] Stroke Risk Points  Current as of just now        N/A 0 to 9 Points:      Last Change: N/A          The GUF4LO0-ETCu risk score (Lip GH, et al. " 2009. © 2010 [employer]) quantifies the risk of stroke for a patient with atrial fibrillation. For patients without atrial fibrillation or under the age of 18 this score appears as N/A. Higher score values generally indicate higher risk of stroke.        This score is not applicable to this patient. Components are not calculated.          Revised Cardiac Risk Index    No data to display       Apfel Simplified Score    No data to display       Risk Analysis Index Results This Encounter    No data found in the last 10 encounters.       Prodigy: High Risk  Total Score: 8              Prodigy Gender Score          ARISCAT Score for Postoperative Pulmonary Complications      Flowsheet Row Pre-Admission Testing from 4/11/2025 in [facility]   Age Calculated Score 3 filed at 04/11/2025 0905   Preoperative SpO2 8 filed at 04/11/2025 0905   Respiratory infection in the last month Either upper or lower (i.e., URI, bronchitis, pneumonia), with fever and antibiotic treatment 0 filed at 04/11/2025 0905   Preoperative anemia (Hgb less than 10 g/dl) 0 filed at 04/11/2025 0905   Surgical incision  0 filed at 04/11/2025 0905   Duration of surgery  0 filed at 04/11/2025 0905   Emergency Procedure  0 filed at 04/11/2025 0905   ARISCAT Total Score  11 filed at 04/11/2025 0905          Lilo Perioperative Risk for Myocardial Infarction or Cardiac Arrest (CLIFFORD)      Flowsheet Row Pre-Admission Testing from 4/11/2025 in [facility]   Calculated Age Score 1.12 filed at 04/11/2025 0906   Functional Status  0 filed at 04/11/2025 0906   ASA Class  -3.29 filed at 04/11/2025 0906   Creatinine 0 filed at 04/11/2025 0906   Type of Procedure  -0.26 filed at 04/11/2025 0906   CLIFFORD Total Score  -7.68 filed at 04/11/2025 0906   CLIFFORD % 0.05 filed at 04/11/2025 0906            Assessment and Plan:   Patient is a 54-year-old male with a BMI of 49 and a history of HTN, HLD, CAD, thoracic aneurysm,  obesity, asthma, SOPHIE on CPAP, newly diagnosed diabetes, GERD, and urethral stricture.    Follows with PCP Dr. Vogel -last seen in office on 2/24/2025 for URI symptoms    #Urinary frequency   #Stricture of his urethra  He is scheduled for cystoscopy with urethral dilation Optilume on 4/17/2025 with Dr. Nagy  Takes tadalafil daily for symptoms with stricture, although he states it did not change his symptoms   >advised to hold tadalafil for 3 days prior to surgery    #HTN, Hx  #HLD, Hx  #CAD, Hx  #Thoracic aneurysm, Hx  -Follows with Dr. Pinedo-last seen 10/10/2024  -Nuclear stress test from 11/19/2024    1. Positive stress EKG for ischemia > baseline EKG at rest showed sinus bradycardia with normal tracing   2. Below average functional capacity for age.   3. No exercise associated cardiac symptoms were noted.   4. Marked hypertensive blood pressure response with exercise     >Probable normal exercise Myoview stress test with some degree of  attenuation artifact in the anterior wall. A small prior anterior  wall infarction without residual rodo-infarct ischemia can not be  excluded but is less likely in view of normal contractility.     >Normal segmental function of the gated images with an exercise  ejection fraction of 62 %.  -Thoracic aorta measuring 4 cm on echocardiogram from 5/23/2023  -BP currently maintained on amlodipine > patient states is well-controlled unless he has PVCs that he feels flushed and finds his blood pressure to be 200s over 100s  -HDL controlled with baby aspirin and rosuvastatin  -Lipid panel from 5/16/2024 shows good control  - Patient is scheduled to follow-up with Dr. Pinedo on 4/16/2025 > advised patient to keep this appointment and discuss surgery on 4/17 with Dr. Pinedo  Cardiac clearance requested from Dr. Pinedo given the results of recent stress test and hypertension (clearance forms faxed to office by [facility] [place])    #Asthma, Hx  #SOPHIE on CPAP, Hx  #URI starting in February  -Uses CPAP  routinely  -Utilizes albuterol rescue inhaler 2 times weekly which is down from 2-3 times daily during height of recent URI  -Patient did have some upper airway congestion initially on exam with wheezes auscultated. He was able to cough and clear his airway with lungs clear to auscultation afterwards. POX 95%  -He denies fever, chills, nasal congestion or fatigue. States his cough and inhaler requirements have improved slowly. No changes in his sputum color (green) or consistency  -CXR from 3/5/25 was negative for acute process  -Advised patient to reach out to his PCP regarding continued productive cough. If symptoms worsen, advised to contact PCP and notify Dr. Nagy of illness  -ARISCAT score 11 given recent URI and POX of 95%    #Newly diagnosed diabetes  HA1C in May 2024 was up to 7.3  Patient attempting diet and exercise to control glucose levels with moderate results  HA1C on 10/1/2024 down to 7  Not currently on any medications    #GERD, Hx  Currently maintained on omeprazole with good control    #Anxiety, Hx  Currently maintained on paroxetine with good control    #Seborrheic dermatitis of the scalp, Hx  Follows with dermatology-last seen in December 2024  Using ketoconazole and clobetasol    Labs obtained today and pending (BMP, CBC, UA)    Cardiac clearance requested from Dr. Pinedo given the results of recent stress test and hypertension    I spent 45 minutes in the professional and overall care of this patient. Greater than 50% of this time was spent counseling patient, reviewing plan of care and discussing medication perioperative management.    Maia Cavanaugh, APRN-CNP

## 2025-04-11 NOTE — PREPROCEDURE INSTRUCTIONS
Medication List            Accurate as of April 11, 2025  9:07 AM. Always use your most recent med list.                acetaminophen 650 mg suppository  Commonly known as: Tylenol  Medication Adjustments for Surgery: Take/Use as prescribed  Notes to patient: Avoid NSAIDs for 7 days prior to surgery     albuterol 90 mcg/actuation inhaler  Inhale 1-2 puffs every 4 hours if needed for wheezing.  Medication Adjustments for Surgery: Take/Use as prescribed     amLODIPine 5 mg tablet  Commonly known as: Norvasc  Take 1 tablet (5 mg) by mouth once daily.  Medication Adjustments for Surgery: Take/Use as prescribed     aspirin 81 mg EC tablet  Medication Adjustments for Surgery: Take/Use as prescribed     ciclopirox 1 % shampoo  Wet hair and apply shampoo to scalp. Lather and leave on for 3 minutes. Rinse. Do this twice a week at night.  Notes to patient: No longer taking     clobetasol 0.05 % external solution  Commonly known as: Temovate  Apply topically 2 times a day. As needed for rash on scalp  Medication Adjustments for Surgery: Do Not take on the morning of surgery     diphenhydrAMINE 50 mg capsule  Commonly known as: BENADryl  Take 1 capsule (50 mg) by mouth every 6 hours if needed for itching for up to 10 days.  Medication Adjustments for Surgery: Take/Use as prescribed  Notes to patient: Not currently using     ketoconazole 2 % shampoo  Commonly known as: NIZOral  Apply topically once daily. Leave on scalp 5 minutes before rinsing  Medication Adjustments for Surgery: Do Not take on the morning of surgery     metoclopramide 10 mg tablet  Commonly known as: Reglan  Take 1 tablet (10 mg) by mouth 2 times a day as needed (headache) for up to 10 days.  Medication Adjustments for Surgery: Do Not take on the morning of surgery  Notes to patient: No longer taking     omeprazole 20 mg DR capsule  Commonly known as: PriLOSEC  TAKE ONE CAPSULE BY MOUTH DAILY. DO NOT CRUSH OR CHEW.  Medication Adjustments for Surgery: Take  on the morning of surgery     PARoxetine 20 mg tablet  Commonly known as: Paxil  TAKE ONE TABLET BY MOUTH ONCE DAILY IN THE MORNING BEFORE MEALS  Medication Adjustments for Surgery: Take/Use as prescribed     rosuvastatin 20 mg tablet  Commonly known as: Crestor  TAKE ONE TABLET BY MOUTH EVERY DAY  Medication Adjustments for Surgery: Take/Use as prescribed     tadalafil 5 mg tablet  Commonly known as: Cialis  Take 1 tablet (5 mg) by mouth once daily.  Additional Medication Adjustments for Surgery: Other (Comment)  Notes to patient: Stop taking 3 days before surgery                PRE-OPERATIVE INSTRUCTIONS FOR SURGERY    *Do not eat anything after midnight the night of surgery.  This includes food of any kind (including hard candy, cough drops, mints).     You may have up to 13 ounces of clear liquid  until TWO hours prior to your scheduled surgery time  Clear liquids include water, black tea/coffee, (no milk or cream) apple juice and electrolyte drinks (GATORADE).  You may chew gum until TWO hours prior you your surgery/procedure.       *IF you are ORDERED the ERAS protocol: follow as instructed.  DO not drink an additional 13 ounces as noted above.    -----------    *One of our staff members will call you ONE business day before your surgery, between 11am-2 pm to let you know the time to arrive.  If you have not received a call by 2 pm, call [phone]    *When you arrive at the hospital-->GO TO Registration on the ground floor  *Stop smoking 24 hours prior to surgery.  No Marijuana, CBD Oil or Vaping for 48 hours  *No alcohol 24 hours prior to surgery  *You will need a responsible adult to drive you home  -No acrylic nails or nail polish on at least one fingernail, NO polish on toes for foot surgery  -You may be asked to remove your dentures, partial plate, eyeglasses or contact lenses before going to surgery.  Please bring a case for these items.  -Body piercings need to be removed.  Jewelry and valuables  should be left at home.  -Put on loose,  comfortable, clean clothing, that will accommodate bandages    *If you have any further questions about your pre-op instructions,  not mentioned in this handout, then call [phone]*    What you may be asked to bring to surgery:  ___Crutches, walker  ___CPAP machine  ___Urine specimen

## 2025-04-11 NOTE — TELEPHONE ENCOUNTER
Patient requesting refill: Amlodipine   Last OV: 12/31/24  Next OV: 4/30/25  Last refill: 1/14/25    Can be refilled per protocol.   Rec'd notification that patient is scheduled for a cystoscopy with urethral dilation on 4/17/25. General anesthesia.    Requesting cardiac clearance.      Last OV 10/10/24  2. CAD. Elevated calcium score 269 units located in his LAD.  He is on rosuvastatin and a baby aspirin.  I have asked for him to have an exercise nuclear stress test looking for ischemia in the anterior wall.   4. Thoracic aortic aneurysm 4 cm     NM Stress Test 11/19/24- 2 day  Summary:   1. Positive stress EKG for ischemia.   2. Below average functional capacity for age.   3. No exercise associated cardiac symptoms were noted.   4. Marked hypertensive blood pressure response with exercise.   5. Nuclear image results are reported separately.    1. Probable normal exercise Myoview stress test with some degree of  attenuation artifact in the anterior wall. A small prior anterior  wall infarction without residual rodo-infarct ischemia can not be  excluded but is less likely in view of normal contractility.    2. Normal segmental function of the gated images with an exercise  ejection fraction of 62 %.

## 2025-04-11 NOTE — PREPROCEDURE INSTRUCTIONS
Medication List            Accurate as of April 11, 2025  9:05 AM. Always use your most recent med list.                acetaminophen 650 mg suppository  Commonly known as: Tylenol  Medication Adjustments for Surgery: Take/Use as prescribed  Notes to patient: Avoid NSAIDs for 7 days prior to surgery     albuterol 90 mcg/actuation inhaler  Inhale 1-2 puffs every 4 hours if needed for wheezing.  Medication Adjustments for Surgery: Take/Use as prescribed     amLODIPine 5 mg tablet  Commonly known as: Norvasc  Take 1 tablet (5 mg) by mouth once daily.  Medication Adjustments for Surgery: Take/Use as prescribed     aspirin 81 mg EC tablet  Medication Adjustments for Surgery: Take/Use as prescribed     ciclopirox 1 % shampoo  Wet hair and apply shampoo to scalp. Lather and leave on for 3 minutes. Rinse. Do this twice a week at night.  Notes to patient: No longer taking     clobetasol 0.05 % external solution  Commonly known as: Temovate  Apply topically 2 times a day. As needed for rash on scalp  Medication Adjustments for Surgery: Do Not take on the morning of surgery     diphenhydrAMINE 50 mg capsule  Commonly known as: BENADryl  Take 1 capsule (50 mg) by mouth every 6 hours if needed for itching for up to 10 days.  Medication Adjustments for Surgery: Take/Use as prescribed  Notes to patient: Not currently using     ketoconazole 2 % shampoo  Commonly known as: NIZOral  Apply topically once daily. Leave on scalp 5 minutes before rinsing  Medication Adjustments for Surgery: Do Not take on the morning of surgery     metoclopramide 10 mg tablet  Commonly known as: Reglan  Take 1 tablet (10 mg) by mouth 2 times a day as needed (headache) for up to 10 days.  Medication Adjustments for Surgery: Do Not take on the morning of surgery  Notes to patient: No longer taking     omeprazole 20 mg DR capsule  Commonly known as: PriLOSEC  TAKE ONE CAPSULE BY MOUTH DAILY. DO NOT CRUSH OR CHEW.  Medication Adjustments for Surgery: Take  on the morning of surgery     PARoxetine 20 mg tablet  Commonly known as: Paxil  TAKE ONE TABLET BY MOUTH ONCE DAILY IN THE MORNING BEFORE MEALS  Medication Adjustments for Surgery: Take/Use as prescribed     rosuvastatin 20 mg tablet  Commonly known as: Crestor  TAKE ONE TABLET BY MOUTH EVERY DAY  Medication Adjustments for Surgery: Take/Use as prescribed     tadalafil 5 mg tablet  Commonly known as: Cialis  Take 1 tablet (5 mg) by mouth once daily.  Additional Medication Adjustments for Surgery: Other (Comment)  Notes to patient: Stop taking 3 days before surgery                        PRE-OPERATIVE INSTRUCTIONS FOR SURGERY    *Do not eat anything after midnight the night of surgery.  This includes food of any kind (including hard candy, cough drops, mints).     You may have up to 13 ounces of clear liquid  until TWO hours prior to your scheduled surgery time  Clear liquids include water, black tea/coffee, (no milk or cream) apple juice and electrolyte drinks (GATORADE).  You may chew gum until TWO hours prior you your surgery/procedure.       *IF you are ORDERED the ERAS protocol: follow as instructed.  DO not drink an additional 13 ounces as noted above.    -----------    *One of our staff members will call you ONE business day before your surgery, between 11am-2 pm to let you know the time to arrive.  If you have not received a call by 2 pm, call [phone]    *When you arrive at the hospital-->GO TO Registration on the ground floor  *Stop smoking 24 hours prior to surgery.  No Marijuana, CBD Oil or Vaping for 48 hours  *No alcohol 24 hours prior to surgery  *You will need a responsible adult to drive you home  -No acrylic nails or nail polish on at least one fingernail, NO polish on toes for foot surgery  -You may be asked to remove your dentures, partial plate, eyeglasses or contact lenses before going to surgery.  Please bring a case for these items.  -Body piercings need to be removed.  Jewelry and  valuables should be left at home.  -Put on loose,  comfortable, clean clothing, that will accommodate bandages    *If you have any further questions about your pre-op instructions,  not mentioned in this handout, then call [phone]*    What you may be asked to bring to surgery:  ___Crutches, walker  ___CPAP machine  ___Urine specimen

## 2025-04-15 PROBLEM — I49.9 ARRHYTHMIA: Chronic | Status: RESOLVED | Noted: 2023-09-25 | Resolved: 2025-04-15

## 2025-04-15 PROBLEM — K62.5 RECTAL HEMORRHAGE: Status: RESOLVED | Noted: 2023-09-24 | Resolved: 2025-04-15

## 2025-04-15 PROBLEM — R94.39 ABNORMAL NUCLEAR STRESS TEST: Status: ACTIVE | Noted: 2025-04-15

## 2025-04-15 PROBLEM — R06.02 SHORTNESS OF BREATH: Status: RESOLVED | Noted: 2023-09-25 | Resolved: 2025-04-15

## 2025-04-15 PROBLEM — R35.0 URINARY FREQUENCY: Status: RESOLVED | Noted: 2025-04-07 | Resolved: 2025-04-15

## 2025-04-15 PROBLEM — R06.02 SHORTNESS OF BREATH ON EXERTION: Status: RESOLVED | Noted: 2023-09-25 | Resolved: 2025-04-15

## 2025-04-15 PROBLEM — I10 ESSENTIAL HYPERTENSION: Chronic | Status: ACTIVE | Noted: 2025-04-15

## 2025-04-15 NOTE — PROGRESS NOTES
Referred by Khris FRITZ   I am seeing Iwona for 6-month follow-up.  He has been trying to walk on the treadmill 2 mph 30 minutes.  Shortness of breath is the same maybe a little bit better.  He attributes it to weight.  He has had more palpitations which is causing him more anxiety.  This has happened since I stopped his atenolol in October.  His blood pressures still running on the higher side at home.  He is scheduled for surgery for urethral dilatation tomorrow.  No chest pain  Past Medical History:  Problem List Items Addressed This Visit    None     Past Medical History:   Diagnosis Date    Anxiety     Arrhythmia 09/25/2023    Asthma     CAD (coronary artery disease) 09/24/2023 7/5/2023: Elevated  Agatston units (all in LAD)      Diabetes mellitus, new onset 09/24/2023    GERD (gastroesophageal reflux disease)     Hypercholesterolemia 09/24/2023    Hypertension     Mixed conductive and sensorineural hearing loss, unspecified     Mixed hearing loss    Morbid (severe) obesity due to excess calories (Multi)     Severe obesity (BMI 35.0-35.9 with comorbidity)    Obstructive sleep apnea 07/10/2007    Obstructive sleep apnea (adult) (pediatric)     Obstructive sleep apnea, adult    Periodic limb movement disorder     Periodic limb movement disorder    Personal history of other diseases of the circulatory system     History of cardiac murmur    Personal history of other diseases of the digestive system     History of gastritis    Personal history of other diseases of the musculoskeletal system and connective tissue     History of arthritis    Personal history of other diseases of the respiratory system     History of asthma    Type 2 diabetes mellitus without complications 07/20/2021    Controlled diabetes mellitus      Past Surgical History:  He has a past surgical history that includes Other surgical history (12/20/2019); Other surgical history (01/16/2019); Other surgical history (01/16/2019); Inner ear  surgery (08/22/2017); and Hernia repair (08/22/2017).      Social History:  He reports that he has never smoked. He has been exposed to tobacco smoke. He has never used smokeless tobacco. He reports current alcohol use. He reports that he does not use drugs.    Family History:  Family History   Problem Relation Name Age of Onset    Diabetes Mother      Other (Malignant neoplasm of breast) Mother      Other (Cerebrovascular accident (CVA)) Father      Colon cancer Other Grandparent      Allergies:  Patient has no known allergies.    Outpatient Medications:  Current Outpatient Medications   Medication Instructions    acetaminophen (Tylenol) 650 mg suppository Insert into the rectum.    albuterol 90 mcg/actuation inhaler 1-2 puffs, inhalation, Every 4 hours PRN    amLODIPine (NORVASC) 5 mg, oral, Daily    aspirin 81 mg, Daily    ciclopirox 1 % shampoo Wet hair and apply shampoo to scalp. Lather and leave on for 3 minutes. Rinse. Do this twice a week at night.    clobetasol (Temovate) 0.05 % external solution Topical, 2 times daily, As needed for rash on scalp    diphenhydrAMINE (BENADRYL) 50 mg, oral, Every 6 hours PRN    ketoconazole (NIZOral) 2 % shampoo Topical, Daily, Leave on scalp 5 minutes before rinsing    metoclopramide (REGLAN) 10 mg, oral, 2 times daily PRN    omeprazole (PRILOSEC) 20 mg, oral, Daily, Do not crush or chew    PARoxetine (Paxil) 20 mg tablet TAKE ONE TABLET BY MOUTH ONCE DAILY IN THE MORNING BEFORE MEALS    rosuvastatin (CRESTOR) 20 mg, oral, Daily    tadalafil (CIALIS) 5 mg, oral, Daily     Last Recorded Vitals:  There were no vitals filed for this visit.    Physical Exam  Patient is alert and oriented x3.  HEENT is unremarkable mucous members are moist  Neck no JVP no bruits upstrokes are full no thyromegaly  Lungs are clear bilaterally.  No wheezing crackles or rales  Heart regular rhythm normal S1-S2 there is no S3 no murmurs are heard.  Abdomen is soft bs are positive nontender  nondistended no organomegaly no pulsatile masses  Extremities have no edema.  Distal pulses present palpable.  Neuro is grossly nonfocal  Skin has no rashes     Last Labs:  CBC -  Lab Results   Component Value Date    WBC 9.7 04/11/2025    HGB 13.1 (L) 04/11/2025    HCT 42.4 04/11/2025    MCV 86 04/11/2025     04/11/2025     CMP -  Lab Results   Component Value Date    CALCIUM 9.1 04/11/2025    PROT 7.2 05/16/2024    ALBUMIN 3.9 05/16/2024    AST 15 05/16/2024    ALT 10 05/16/2024    ALKPHOS 112 05/16/2024    BILITOT 0.3 05/16/2024     LIPID PANEL -   Lab Results   Component Value Date    CHOL 168 05/16/2024    HDL 36.8 05/16/2024    CHHDL 4.6 05/16/2024    VLDL 21 10/21/2023    TRIG 106 10/21/2023    NHDL 87 10/21/2023     RENAL FUNCTION PANEL -   Lab Results   Component Value Date    K 4.1 04/11/2025     Lab Results   Component Value Date    BNP 12 11/18/2022    HGBA1C 7 (A) 10/01/2024        Procedure    TST 11/19/2024 + stress EKG below avg cap htn bp response  prob nl vs attenuation ant wall vs a prior small ant infarct EF 62%    CAC 7/5/2023 elevated 269 units mildly dilated thoracic aorta 4 cm    ECHO [05/23/2023]: LVSF normal, est EF 55%. TA mildly dial @ 4 cm.     HOLTER [05/23/2023]: Sinus rhythm, -59. No ep/of A-fib / PSVT / high-grade AV block. No VT.     Assessment/Plan   1. Shortness of breath. He and I both believe it's related to wt. It happens more when he bends over. He is using the TM 30 min/day at 2mph. No CP.  Will continue to monitor    2. CAD. Elevated calcium score 269 units located in his LAD.  He is on rosuvastatin and a baby aspirin.  I have asked for him to have an exercise nuclear stress test looking for ischemia in the anterior wall.TST 11/19/2024 + stress EKG below avg cap htn bp response  prob nl vs attenuation ant wall vs a prior small ant infarct EF 62%.  Given his size, I do believe this is more attenuation artifact.    3.  Palpitations.  More palp since I stopped his  atenolol in 10/2024. I'll restart the atenolol 25 mg/day    4.  Thoracic aortic aneurysm 4 cm    5.  Hyperlipidemia.  5/16/2024 HDL 37 triglycerides not recorded non-HDL cholesterol 131.  Followed by his PCP on rosuvastatin 20 mg    6.  Hypertension.  BP still high. Will restart atenolol along with amlodipine.  Reassess blood pressure after surgery    RTC 6 months  Jamal Pinedo MD     Instructions and follow up

## 2025-04-16 ENCOUNTER — APPOINTMENT (OUTPATIENT)
Dept: CARDIOLOGY | Facility: CLINIC | Age: 55
End: 2025-04-16
Payer: COMMERCIAL

## 2025-04-16 VITALS
BODY MASS INDEX: 48.4 KG/M2 | HEART RATE: 94 BPM | DIASTOLIC BLOOD PRESSURE: 84 MMHG | SYSTOLIC BLOOD PRESSURE: 152 MMHG | OXYGEN SATURATION: 95 % | WEIGHT: 315 LBS

## 2025-04-16 DIAGNOSIS — R06.02 SHORTNESS OF BREATH ON EXERTION: ICD-10-CM

## 2025-04-16 DIAGNOSIS — I25.10 CORONARY ARTERY DISEASE INVOLVING NATIVE CORONARY ARTERY OF NATIVE HEART WITHOUT ANGINA PECTORIS: Chronic | ICD-10-CM

## 2025-04-16 DIAGNOSIS — R06.09 DYSPNEA ON EXERTION: ICD-10-CM

## 2025-04-16 DIAGNOSIS — I10 ESSENTIAL HYPERTENSION: Chronic | ICD-10-CM

## 2025-04-16 DIAGNOSIS — E78.00 HYPERCHOLESTEROLEMIA: Chronic | ICD-10-CM

## 2025-04-16 DIAGNOSIS — R94.39 ABNORMAL NUCLEAR STRESS TEST: Primary | ICD-10-CM

## 2025-04-16 DIAGNOSIS — R00.2 PALPITATIONS: ICD-10-CM

## 2025-04-16 PROBLEM — R35.0 URINARY FREQUENCY: Status: ACTIVE | Noted: 2025-04-07

## 2025-04-16 PROCEDURE — 3077F SYST BP >= 140 MM HG: CPT | Performed by: INTERNAL MEDICINE

## 2025-04-16 PROCEDURE — 99214 OFFICE O/P EST MOD 30 MIN: CPT | Performed by: INTERNAL MEDICINE

## 2025-04-16 PROCEDURE — 3079F DIAST BP 80-89 MM HG: CPT | Performed by: INTERNAL MEDICINE

## 2025-04-16 PROCEDURE — 1036F TOBACCO NON-USER: CPT | Performed by: INTERNAL MEDICINE

## 2025-04-16 RX ORDER — ATENOLOL 25 MG/1
25 TABLET ORAL DAILY
Qty: 90 TABLET | Refills: 3 | Status: SHIPPED | OUTPATIENT
Start: 2025-04-16 | End: 2026-04-16

## 2025-04-16 NOTE — PATIENT INSTRUCTIONS
1. Shortness of breath. He and I both believe it's related to wt. It happens more when he bends over. He is using the TM 30 min/day at 2mph. No CP.  Will continue to monitor    2. CAD. Elevated calcium score 269 units located in his LAD.  He is on rosuvastatin and a baby aspirin.  I have asked for him to have an exercise nuclear stress test looking for ischemia in the anterior wall.TST 11/19/2024 + stress EKG below avg cap htn bp response  prob nl vs attenuation ant wall vs a prior small ant infarct EF 62%.  Given his size, I do believe this is more attenuation artifact.    3.  Palpitations.  More palp since I stopped his atenolol in 10/2024. I'll restart the atenolol 25 mg/day    4.  Thoracic aortic aneurysm 4 cm    5.  Hyperlipidemia.  5/16/2024 HDL 37 triglycerides not recorded non-HDL cholesterol 131.  Followed by his PCP on rosuvastatin 20 mg    6.  Hypertension.  BP still high. Will restart atenolol along with amlodipine.  Reassess blood pressure after surgery    RTC 6 months

## 2025-04-17 ENCOUNTER — ANESTHESIA (OUTPATIENT)
Dept: OPERATING ROOM | Facility: HOSPITAL | Age: 55
End: 2025-04-17
Payer: COMMERCIAL

## 2025-04-17 ENCOUNTER — HOSPITAL ENCOUNTER (OUTPATIENT)
Facility: HOSPITAL | Age: 55
Setting detail: OUTPATIENT SURGERY
Discharge: HOME | End: 2025-04-17
Attending: UROLOGY | Admitting: UROLOGY
Payer: COMMERCIAL

## 2025-04-17 ENCOUNTER — ANESTHESIA EVENT (OUTPATIENT)
Dept: OPERATING ROOM | Facility: HOSPITAL | Age: 55
End: 2025-04-17
Payer: COMMERCIAL

## 2025-04-17 VITALS
OXYGEN SATURATION: 93 % | DIASTOLIC BLOOD PRESSURE: 74 MMHG | WEIGHT: 315 LBS | BODY MASS INDEX: 44.1 KG/M2 | TEMPERATURE: 98.2 F | RESPIRATION RATE: 20 BRPM | HEART RATE: 61 BPM | HEIGHT: 71 IN | SYSTOLIC BLOOD PRESSURE: 136 MMHG

## 2025-04-17 DIAGNOSIS — R35.0 URINARY FREQUENCY: Primary | ICD-10-CM

## 2025-04-17 DIAGNOSIS — N35.919 STRICTURE OF MALE URETHRA, UNSPECIFIED STRICTURE TYPE: ICD-10-CM

## 2025-04-17 LAB — GLUCOSE BLD MANUAL STRIP-MCNC: 147 MG/DL (ref 74–99)

## 2025-04-17 PROCEDURE — 2720000007 HC OR 272 NO HCPCS: Performed by: UROLOGY

## 2025-04-17 PROCEDURE — 2500000001 HC RX 250 WO HCPCS SELF ADMINISTERED DRUGS (ALT 637 FOR MEDICARE OP): Performed by: STUDENT IN AN ORGANIZED HEALTH CARE EDUCATION/TRAINING PROGRAM

## 2025-04-17 PROCEDURE — 3700000002 HC GENERAL ANESTHESIA TIME - EACH INCREMENTAL 1 MINUTE: Performed by: UROLOGY

## 2025-04-17 PROCEDURE — 7100000001 HC RECOVERY ROOM TIME - INITIAL BASE CHARGE: Performed by: UROLOGY

## 2025-04-17 PROCEDURE — 2500000004 HC RX 250 GENERAL PHARMACY W/ HCPCS (ALT 636 FOR OP/ED): Performed by: NURSE PRACTITIONER

## 2025-04-17 PROCEDURE — 3600000003 HC OR TIME - INITIAL BASE CHARGE - PROCEDURE LEVEL THREE: Performed by: UROLOGY

## 2025-04-17 PROCEDURE — 3700000001 HC GENERAL ANESTHESIA TIME - INITIAL BASE CHARGE: Performed by: UROLOGY

## 2025-04-17 PROCEDURE — 7100000010 HC PHASE TWO TIME - EACH INCREMENTAL 1 MINUTE: Performed by: UROLOGY

## 2025-04-17 PROCEDURE — C1769 GUIDE WIRE: HCPCS | Performed by: UROLOGY

## 2025-04-17 PROCEDURE — 52284 CYSTO RX BALO CATH URTL STRX: CPT | Performed by: UROLOGY

## 2025-04-17 PROCEDURE — 3600000008 HC OR TIME - EACH INCREMENTAL 1 MINUTE - PROCEDURE LEVEL THREE: Performed by: UROLOGY

## 2025-04-17 PROCEDURE — A52281 PR CYSTOSCOPY,DIL URETHRAL STRICTURE: Performed by: ANESTHESIOLOGIST ASSISTANT

## 2025-04-17 PROCEDURE — 2500000004 HC RX 250 GENERAL PHARMACY W/ HCPCS (ALT 636 FOR OP/ED): Mod: JZ | Performed by: ANESTHESIOLOGIST ASSISTANT

## 2025-04-17 PROCEDURE — A52281 PR CYSTOSCOPY,DIL URETHRAL STRICTURE: Performed by: ANESTHESIOLOGY

## 2025-04-17 PROCEDURE — 2780000003 HC OR 278 NO HCPCS: Performed by: UROLOGY

## 2025-04-17 PROCEDURE — C1726 CATH, BAL DIL, NON-VASCULAR: HCPCS | Performed by: UROLOGY

## 2025-04-17 PROCEDURE — 2500000005 HC RX 250 GENERAL PHARMACY W/O HCPCS: Performed by: UROLOGY

## 2025-04-17 PROCEDURE — 2500000004 HC RX 250 GENERAL PHARMACY W/ HCPCS (ALT 636 FOR OP/ED)

## 2025-04-17 PROCEDURE — 7100000002 HC RECOVERY ROOM TIME - EACH INCREMENTAL 1 MINUTE: Performed by: UROLOGY

## 2025-04-17 PROCEDURE — 7100000009 HC PHASE TWO TIME - INITIAL BASE CHARGE: Performed by: UROLOGY

## 2025-04-17 PROCEDURE — C1889 IMPLANT/INSERT DEVICE, NOC: HCPCS | Performed by: UROLOGY

## 2025-04-17 PROCEDURE — 82947 ASSAY GLUCOSE BLOOD QUANT: CPT

## 2025-04-17 RX ORDER — PHENAZOPYRIDINE HYDROCHLORIDE 200 MG/1
200 TABLET, FILM COATED ORAL
Qty: 9 TABLET | Refills: 0 | Status: SHIPPED | OUTPATIENT
Start: 2025-04-17 | End: 2025-04-20

## 2025-04-17 RX ORDER — PROCHLORPERAZINE EDISYLATE 5 MG/ML
5 INJECTION INTRAMUSCULAR; INTRAVENOUS ONCE AS NEEDED
Status: DISCONTINUED | OUTPATIENT
Start: 2025-04-17 | End: 2025-04-17 | Stop reason: HOSPADM

## 2025-04-17 RX ORDER — ROCURONIUM BROMIDE 10 MG/ML
INJECTION, SOLUTION INTRAVENOUS AS NEEDED
Status: DISCONTINUED | OUTPATIENT
Start: 2025-04-17 | End: 2025-04-17

## 2025-04-17 RX ORDER — ONDANSETRON HYDROCHLORIDE 2 MG/ML
4 INJECTION, SOLUTION INTRAVENOUS ONCE AS NEEDED
Status: DISCONTINUED | OUTPATIENT
Start: 2025-04-17 | End: 2025-04-17 | Stop reason: HOSPADM

## 2025-04-17 RX ORDER — SUCCINYLCHOLINE CHLORIDE 20 MG/ML INJECTION SOLUTION
SOLUTION AS NEEDED
Status: DISCONTINUED | OUTPATIENT
Start: 2025-04-17 | End: 2025-04-17

## 2025-04-17 RX ORDER — IPRATROPIUM BROMIDE 0.5 MG/2.5ML
500 SOLUTION RESPIRATORY (INHALATION) ONCE
Status: DISCONTINUED | OUTPATIENT
Start: 2025-04-17 | End: 2025-04-17 | Stop reason: HOSPADM

## 2025-04-17 RX ORDER — MIDAZOLAM HYDROCHLORIDE 1 MG/ML
INJECTION, SOLUTION INTRAMUSCULAR; INTRAVENOUS AS NEEDED
Status: DISCONTINUED | OUTPATIENT
Start: 2025-04-17 | End: 2025-04-17

## 2025-04-17 RX ORDER — FENTANYL CITRATE 50 UG/ML
INJECTION, SOLUTION INTRAMUSCULAR; INTRAVENOUS AS NEEDED
Status: DISCONTINUED | OUTPATIENT
Start: 2025-04-17 | End: 2025-04-17

## 2025-04-17 RX ORDER — ACETAMINOPHEN 500 MG
1000 TABLET ORAL EVERY 6 HOURS PRN
Qty: 40 TABLET | Refills: 0 | Status: SHIPPED | OUTPATIENT
Start: 2025-04-17 | End: 2025-04-22

## 2025-04-17 RX ORDER — PANTOPRAZOLE SODIUM 40 MG/1
40 TABLET, DELAYED RELEASE ORAL
Status: DISCONTINUED | OUTPATIENT
Start: 2025-04-18 | End: 2025-04-17 | Stop reason: HOSPADM

## 2025-04-17 RX ORDER — PANTOPRAZOLE SODIUM 40 MG/10ML
40 INJECTION, POWDER, LYOPHILIZED, FOR SOLUTION INTRAVENOUS
Status: DISCONTINUED | OUTPATIENT
Start: 2025-04-18 | End: 2025-04-17 | Stop reason: HOSPADM

## 2025-04-17 RX ORDER — SODIUM CHLORIDE, SODIUM LACTATE, POTASSIUM CHLORIDE, CALCIUM CHLORIDE 600; 310; 30; 20 MG/100ML; MG/100ML; MG/100ML; MG/100ML
100 INJECTION, SOLUTION INTRAVENOUS CONTINUOUS
Status: DISCONTINUED | OUTPATIENT
Start: 2025-04-17 | End: 2025-04-17 | Stop reason: HOSPADM

## 2025-04-17 RX ORDER — ONDANSETRON HYDROCHLORIDE 2 MG/ML
INJECTION, SOLUTION INTRAVENOUS AS NEEDED
Status: DISCONTINUED | OUTPATIENT
Start: 2025-04-17 | End: 2025-04-17

## 2025-04-17 RX ORDER — CEFAZOLIN SODIUM 2 G/100ML
2 INJECTION, SOLUTION INTRAVENOUS ONCE
Status: COMPLETED | OUTPATIENT
Start: 2025-04-17 | End: 2025-04-17

## 2025-04-17 RX ORDER — KETOROLAC TROMETHAMINE 30 MG/ML
15 INJECTION, SOLUTION INTRAMUSCULAR; INTRAVENOUS ONCE AS NEEDED
Status: DISCONTINUED | OUTPATIENT
Start: 2025-04-17 | End: 2025-04-17 | Stop reason: HOSPADM

## 2025-04-17 RX ORDER — CELECOXIB 100 MG/1
400 CAPSULE ORAL ONCE
Status: COMPLETED | OUTPATIENT
Start: 2025-04-17 | End: 2025-04-17

## 2025-04-17 RX ORDER — SODIUM CHLORIDE 0.9 G/100ML
INJECTION, SOLUTION IRRIGATION AS NEEDED
Status: DISCONTINUED | OUTPATIENT
Start: 2025-04-17 | End: 2025-04-17 | Stop reason: HOSPADM

## 2025-04-17 RX ORDER — MEPERIDINE HYDROCHLORIDE 50 MG/ML
12.5 INJECTION INTRAMUSCULAR; INTRAVENOUS; SUBCUTANEOUS EVERY 10 MIN PRN
Status: DISCONTINUED | OUTPATIENT
Start: 2025-04-17 | End: 2025-04-17 | Stop reason: HOSPADM

## 2025-04-17 RX ORDER — LIDOCAINE HYDROCHLORIDE 10 MG/ML
0.1 INJECTION, SOLUTION INFILTRATION; PERINEURAL ONCE
Status: DISCONTINUED | OUTPATIENT
Start: 2025-04-17 | End: 2025-04-17 | Stop reason: HOSPADM

## 2025-04-17 RX ORDER — ACETAMINOPHEN 325 MG/1
975 TABLET ORAL ONCE
Status: COMPLETED | OUTPATIENT
Start: 2025-04-17 | End: 2025-04-17

## 2025-04-17 RX ORDER — ALBUTEROL SULFATE 0.83 MG/ML
2.5 SOLUTION RESPIRATORY (INHALATION) ONCE AS NEEDED
Status: DISCONTINUED | OUTPATIENT
Start: 2025-04-17 | End: 2025-04-17 | Stop reason: HOSPADM

## 2025-04-17 RX ORDER — LIDOCAINE HCL/PF 100 MG/5ML
SYRINGE (ML) INTRAVENOUS AS NEEDED
Status: DISCONTINUED | OUTPATIENT
Start: 2025-04-17 | End: 2025-04-17

## 2025-04-17 RX ORDER — WATER 1 ML/ML
INJECTION IRRIGATION AS NEEDED
Status: DISCONTINUED | OUTPATIENT
Start: 2025-04-17 | End: 2025-04-17 | Stop reason: HOSPADM

## 2025-04-17 RX ORDER — ACETAMINOPHEN 325 MG/1
650 TABLET ORAL EVERY 4 HOURS PRN
Status: DISCONTINUED | OUTPATIENT
Start: 2025-04-17 | End: 2025-04-17 | Stop reason: HOSPADM

## 2025-04-17 RX ORDER — PANTOPRAZOLE SODIUM 40 MG/10ML
INJECTION, POWDER, LYOPHILIZED, FOR SOLUTION INTRAVENOUS
Status: COMPLETED
Start: 2025-04-17 | End: 2025-04-17

## 2025-04-17 RX ORDER — PROPOFOL 10 MG/ML
INJECTION, EMULSION INTRAVENOUS AS NEEDED
Status: DISCONTINUED | OUTPATIENT
Start: 2025-04-17 | End: 2025-04-17

## 2025-04-17 RX ADMIN — SUGAMMADEX 200 MG: 100 INJECTION, SOLUTION INTRAVENOUS at 10:35

## 2025-04-17 RX ADMIN — ONDANSETRON 4 MG: 2 INJECTION, SOLUTION INTRAMUSCULAR; INTRAVENOUS at 10:36

## 2025-04-17 RX ADMIN — PANTOPRAZOLE SODIUM 40 MG: 40 INJECTION, POWDER, LYOPHILIZED, FOR SOLUTION INTRAVENOUS at 09:53

## 2025-04-17 RX ADMIN — CELECOXIB 400 MG: 100 CAPSULE ORAL at 09:52

## 2025-04-17 RX ADMIN — FENTANYL CITRATE 100 MCG: 50 INJECTION, SOLUTION INTRAMUSCULAR; INTRAVENOUS at 10:07

## 2025-04-17 RX ADMIN — SODIUM CHLORIDE, POTASSIUM CHLORIDE, SODIUM LACTATE AND CALCIUM CHLORIDE: 600; 310; 30; 20 INJECTION, SOLUTION INTRAVENOUS at 09:25

## 2025-04-17 RX ADMIN — ROCURONIUM BROMIDE 20 MG: 10 INJECTION INTRAVENOUS at 10:07

## 2025-04-17 RX ADMIN — PROPOFOL 50 MG: 10 INJECTION, EMULSION INTRAVENOUS at 10:20

## 2025-04-17 RX ADMIN — LIDOCAINE HYDROCHLORIDE 100 MG: 20 INJECTION, SOLUTION INTRAVENOUS at 10:07

## 2025-04-17 RX ADMIN — PANTOPRAZOLE SODIUM 40 MG: 40 INJECTION, POWDER, FOR SOLUTION INTRAVENOUS at 09:53

## 2025-04-17 RX ADMIN — MIDAZOLAM 2 MG: 1 INJECTION INTRAMUSCULAR; INTRAVENOUS at 10:05

## 2025-04-17 RX ADMIN — CEFAZOLIN SODIUM 3 G: 2 INJECTION, SOLUTION INTRAVENOUS at 10:14

## 2025-04-17 RX ADMIN — PROPOFOL 20 MG: 10 INJECTION, EMULSION INTRAVENOUS at 10:36

## 2025-04-17 RX ADMIN — PROPOFOL 20 MG: 10 INJECTION, EMULSION INTRAVENOUS at 10:33

## 2025-04-17 RX ADMIN — FENTANYL CITRATE 50 MCG: 50 INJECTION, SOLUTION INTRAMUSCULAR; INTRAVENOUS at 10:21

## 2025-04-17 RX ADMIN — Medication 140 MG: at 10:08

## 2025-04-17 RX ADMIN — PROPOFOL 200 MG: 10 INJECTION, EMULSION INTRAVENOUS at 10:07

## 2025-04-17 RX ADMIN — ACETAMINOPHEN 975 MG: 325 TABLET, FILM COATED ORAL at 09:52

## 2025-04-17 SDOH — HEALTH STABILITY: MENTAL HEALTH: CURRENT SMOKER: 0

## 2025-04-17 ASSESSMENT — PAIN - FUNCTIONAL ASSESSMENT
PAIN_FUNCTIONAL_ASSESSMENT: 0-10
PAIN_FUNCTIONAL_ASSESSMENT: 0-10

## 2025-04-17 ASSESSMENT — PAIN SCALES - GENERAL
PAINLEVEL_OUTOF10: 0 - NO PAIN
PAIN_LEVEL: 0
PAINLEVEL_OUTOF10: 0 - NO PAIN

## 2025-04-17 NOTE — DISCHARGE INSTRUCTIONS
[facility]    DISCHARGE INSTRUCTIONS     Cystoscopy    Chavo Mariscal    Call [phone] during regular daytime business hours (8:00 am - 5:00 pm) and after 5:00 pm ask for the Urology resident with any questions or concerns.      If it is a life-threatening situation, proceed to the nearest emergency department.        Follow-up appointment:  Please arrange a follow up appointment in 4 weeks.     Thank you for the opportunity to care for you today.  Your health and healing are very important to us.  We hope we made you feel as comfortable as possible and are committed to your recovery and continued well-being.      The following is a brief overview of your cystoscopy procedure today. Some of the information contained on this summary may be confidential.  This information should be kept in your records and should be shared with your regular doctor.    Physicians:   Dr. Nagy      Procedure performed: cystoscopy (looked inside your bladder), urethral dilation      What to Expect During your Recovery and Home Care  Anesthesia Side Effects   You received anesthesia today.  You may feel sleepy, tired, or have a sore throat.   You may also feel drowsiness, dizziness, or inability to think clearly.  For your safety, do not drive, drink alcoholic beverages, take any unprescribed medication or make any important decisions for 24 hours.  A responsible adult should be with you for 24 hours.        Activity and Recovery    No heavy lifting today. Rest for the next 24 hours.       Pain Control  Unfortunately, you may experience pain after your procedure.  Frequency and urgency to urinate and mild discomfort are expected. Adequate pain management can include alternative measures to help ease your pain and that can include over the counter Tylenol or ibuprofen which can be taken as prescribed as needed for breakthrough pain. Do not take more than 4,000mg of Tylenol in a 24-hour period.        Nausea/Vomiting   Clear  liquids are best tolerated at first. Start slow, advance your diet as tolerated to normal foods. Avoid spicy, greasy, heavy foods at first. Also, you may feel nauseous or like you need to vomit if you take any type of medication on an empty stomach.  Call your physician if you are unable to eat or drink and have persistent vomiting.    Signs of Bleeding   You are going to have some blood in your urine. Your urine will be light pink to yellow. If urine becomes thick dark franki red, has large clots or you are unable to urinate, please notify your physician.    Treatment/wound care:   Keep area(s) clean and dry.   It is okay to shower 24 hours after time of surgery.      Signs of Infection  Signs of infection can include fever, drainage(green/yellow), chills, burning sensation with passing of urine, or severe abdominal pain.  If you see any of these occur, please contact your doctor's office at [phone].  Any fever higher than 100.4, especially if associated with an ill feeling, abdominal pain, chills, or nausea should be reported to your surgeon.        Assist in bowel movements/urination  Increase fiber in diet  Increase water (6 to 8 glasses)  Increase walking     Additional Instructions:   Increase water intake for the next 24 hours to help flush out our urinary system.    You will remove your catheter on Sunday 4/20/24. Urination should occur within 8 hours of removal/  If you have tried the above methods and your bladder still feels full and you cannot use the bathroom, please go to your nearest Emergency room/contact your physician.

## 2025-04-17 NOTE — ANESTHESIA POSTPROCEDURE EVALUATION
Patient: Chavo Mariscal    Procedure Summary       Date: 04/17/25 Room / Location: [facility] 03 / [facility]    Anesthesia Start: 1001 Anesthesia Stop: 1056    Procedure: CYSTOSCOPY/ OPTILUME URETHRAL DILATION Diagnosis:       Urinary frequency      Stricture of male urethra, unspecified stricture type      (Urinary frequency [R35.0])      (Stricture of male urethra, unspecified stricture type [N35.919])    Surgeons: Jonas Nagy MD Responsible Provider: Jason Steele MD    Anesthesia Type: general ASA Status: 3            Anesthesia Type: general    Vitals Value Taken Time   /66 04/17/25 10:55   Temp 36.8 °C (98.2 °F) 04/17/25 10:55   Pulse 84 04/17/25 10:55   Resp 16 04/17/25 10:55   SpO2 100 % 04/17/25 10:55       Anesthesia Post Evaluation    Patient location during evaluation: PACU  Patient participation: waiting for patient participation  Level of consciousness: sleepy but conscious  Pain score: 0  Pain management: adequate  Airway patency: patent  Cardiovascular status: acceptable and hemodynamically stable  Respiratory status: face mask  Hydration status: acceptable  Postoperative Nausea and Vomiting: none        No notable events documented.

## 2025-04-17 NOTE — ANESTHESIA PROCEDURE NOTES
Airway  Date/Time: 4/17/2025 10:09 AM  Reason: elective    Airway not difficult    Staffing  Performed: AURY   Authorized by: Jason Steele MD    Performed by: PEDRO PABLO Smart  Patient location during procedure: OR    Patient Condition  Indications for airway management: anesthesia  Patient position: sniffing  MILS maintained throughout  Sedation level: deep     Final Airway Details   Preoxygenated: yes  Final airway type: endotracheal airway  Successful airway: ETT  Cuffed: yes   Successful intubation technique: video laryngoscopy  Adjuncts used in placement: intubating stylet  Endotracheal tube insertion site: oral  Blade: Arleth  Blade size: #4  ETT size (mm): 8.0  Cormack-Lehane Classification: grade IIa - partial view of glottis  Placement verified by: chest auscultation and capnometry   Cuff volume (mL): 10  Measured from: lips  ETT to lips (cm): 23  Number of attempts at approach: 1  Number of other approaches attempted: 0    Additional Comments  Flores mac4

## 2025-04-17 NOTE — ANESTHESIA PREPROCEDURE EVALUATION
Patient: Chavo Mariscal    Procedure Information       Date/Time: 04/17/25 1030    Procedure: CYSTOSCOPY/ OPTILUME URETHRAL DILATION - Optliume    Location: [facility] / [facility]    Surgeons: Jonas Nagy MD            Relevant Problems   Cardiac   (+) CAD (coronary artery disease)   (+) Chest pain   (+) Essential hypertension   (+) Frequent PVCs   (+) Hypercholesterolemia      Pulmonary   (+) Asthma   (+) Dyspnea on exertion      Neuro   (+) Anxiety      Endocrine   (+) Morbid obesity (Multi)       Clinical information reviewed:      Problems              NPO Detail:  No data recorded     Physical Exam    Airway  Mallampati: III  TM distance: <3 FB  Neck ROM: full  Mouth opening: 3 or more finger widths     Cardiovascular - normal exam  Rhythm: regular  Rate: normal     Dental - normal exam     Pulmonary - normal exam   Abdominal (+) obese             Anesthesia Plan    History of general anesthesia?: yes  History of complications of general anesthesia?: no    ASA 3     general     The patient is not a current smoker.  Education provided regarding risk of obstructive sleep apnea.  intravenous induction   Postoperative pain plan includes opioids.  Trial extubation is planned.  Anesthetic plan and risks discussed with patient.    Plan discussed with CAA.

## 2025-04-17 NOTE — OP NOTE
CYSTOSCOPY/ OPTILUME URETHRAL DILATION Operative Note     Date: 2025  OR Location: [facility]    Name: Chavo Mariscal, : 1970, Age: 54 y.o., MRN: [id number], Sex: male    Diagnosis  Pre-op Diagnosis      * Urinary frequency [R35.0]     * Stricture of male urethra, unspecified stricture type [N35.919] Post-op Diagnosis     * Urinary frequency [R35.0]     * Stricture of male urethra, unspecified stricture type [N35.919]     Procedures  CYSTOSCOPY/ OPTILUME URETHRAL DILATION  19015 - OR CYSTO CALIBRATION DILAT URTL STRIX/STENOSIS      Surgeons      * Jonas Nagy - Primary    Resident/Fellow/Other Assistant:  Surgeons and Role:     * Garo Messer MD - Resident - Assisting    Staff:   Telmaulator: Ju Mccarthy Person: Fatimah  Surgical Assistant: Veronica    Anesthesia Staff: Anesthesiologist: Jason Steele MD  C-AA: PEDRO PABLO Smart  AURY: Iglesia Ross    Procedure Summary  Anesthesia: General  ASA: III  Estimated Blood Loss: 5mL  Intra-op Medications:   Administrations occurring from 1030 to 1200 on 25:   Medication Name Total Dose   LR bolus Cannot be calculated   ondansetron (Zofran) 2 mg/mL injection 4 mg   propofol (Diprivan) injection 10 mg/mL 40 mg   sugammadex (Bridion) 200 mg/2 mL injection 200 mg              Anesthesia Record               Intraprocedure I/O Totals          Intake    LR bolus 600.00 mL    ceFAZolin (Ancef) 2 g in dextrose (iso)  mL 150.00 mL    Total Intake 750 mL          Specimen: No specimens collected              Drains and/or Catheters:   Urethral Catheter Non-latex 16 Fr. (Active)   Site Assessment Clean;Skin intact 25 1052   Collection Container Standard drainage bag 25 1052   Securement Method Securing device (Describe) 25 1052       Tourniquet Times: None        Implants: None    Findings: soft proximal bulbar stricture ~16Fr, ~1.5cm    Indications: Chavo Mariscal is an 54 y.o. male who is having surgery for Urinary frequency  [R35.0]  Stricture of male urethra, unspecified stricture type [N35.919].     The patient was seen in the preoperative area. The risks, benefits, complications, treatment options, non-operative alternatives, expected recovery and outcomes were discussed with the patient. The possibilities of reaction to medication, pulmonary aspiration, injury to surrounding structures, bleeding, recurrent infection, the need for additional procedures, failure to diagnose a condition, and creating a complication requiring transfusion or operation were discussed with the patient. The patient concurred with the proposed plan, giving informed consent.  The site of surgery was properly noted/marked if necessary per policy. The patient has been actively warmed in preoperative area. Preoperative antibiotics have been ordered and given within 1 hours of incision. 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 He presents today for cystoscopy with urethral dilation. Risks, benefits, and alternatives were discussed in the preoperative setting and consent was obtained.     The patient was then brought back to the operating room and transferred to the OR table. Time out was performed, antibiotics were given, and anesthesia was inducted. Patient was positioned in dorsal lithotomy, and prepped and draped in the usual sterile fashion.     A 22 Fr rigid cystoscope was used to perform cystourethroscopy. The bulbar urethral area of concern was around 16Fr in diameter. A sensor wire was passed through the stricture into the bladder. The scope was backed out a few centimeters before inserting S dilators the optilume 5cm 30fr dilator through the scope and into the strictured portion of urethra. The stricture was dilated to 20Fr with the S dilators. The prostate has bilateral hypertrophy. The bladder was  without erythema, lesions, or stones. Uos in normal orthotopic position. We then used the optilume balloon to dilate to 30Fr at 10psi for a total of 3 minutes. A 16Fr catheter was placed over the wire with successful return of clear pink urine. This concluded the procedure. Patient was awoken form anesthesia without complication and was transferred to PACU in stable condition.    Complications:  None; patient tolerated the procedure well.    Disposition: PACU - hemodynamically stable.  Condition: stable         Attending Attestation:     Jonas Nagy  Phone Number: [phone]

## 2025-04-30 ENCOUNTER — TELEMEDICINE (OUTPATIENT)
Dept: PRIMARY CARE | Facility: CLINIC | Age: 55
End: 2025-04-30
Payer: COMMERCIAL

## 2025-04-30 DIAGNOSIS — E66.01 MORBID OBESITY (MULTI): ICD-10-CM

## 2025-04-30 DIAGNOSIS — E11.9 TYPE 2 DIABETES MELLITUS WITHOUT COMPLICATION, WITHOUT LONG-TERM CURRENT USE OF INSULIN: Primary | ICD-10-CM

## 2025-04-30 PROCEDURE — 99213 OFFICE O/P EST LOW 20 MIN: CPT | Performed by: INTERNAL MEDICINE

## 2025-04-30 RX ORDER — METFORMIN HYDROCHLORIDE 500 MG/1
500 TABLET ORAL
Qty: 100 TABLET | Refills: 3 | Status: SHIPPED | OUTPATIENT
Start: 2025-04-30 | End: 2026-06-04

## 2025-04-30 NOTE — PROGRESS NOTES
" \"This visit was completed via video  . All issues as below were discussed and addressed but no physical exam was performed. If it was felt that the patient should be evaluated in clinic then they were directed there. The patient verbally consented to visit.\"    Talked and evaluated  patient over video call .    He recently had urologic surgery with urethral dilatation and cystoscopy . He is followed by urologist, dr Nagy about it.   During surgery his work up showed A1C to be 7.3 and was advised him to contact me for medication for Diabetes. He has hx of Dm and was not on med until now.   Watches diet for DM     Discussed about med. Agreed to start on it.     Objective:  Patient did not seem to be in any distress over Video visit  conversation .   Physical examination was not done due to virtual appointment .      Assessment:  1. Type 2 diabetes mellitus without complication, without long-term curren  t use of insulin -started on new med   2. Morbid obesity (Multi)      Plan  Started on Metformin for Dm and also to see if helps with weight reduction . Side effects of medication were discussed. All questions related to medication answered to patient's satisfaction    Blood tests in 3 months .   Discussed about obesity and complications related to it. Discussed about weight reduction and regular exercise to decrease weight. Questions related to it answered to patient's satisfaction.  F/U with urology as advised  Patient felt satisfied with plan        "

## 2025-05-19 ENCOUNTER — APPOINTMENT (OUTPATIENT)
Age: 55
End: 2025-05-19
Payer: COMMERCIAL

## 2025-05-19 VITALS — HEART RATE: 88 BPM | SYSTOLIC BLOOD PRESSURE: 153 MMHG | DIASTOLIC BLOOD PRESSURE: 68 MMHG

## 2025-05-19 DIAGNOSIS — N52.9 ERECTILE DYSFUNCTION, UNSPECIFIED ERECTILE DYSFUNCTION TYPE: ICD-10-CM

## 2025-05-19 DIAGNOSIS — N35.919 STRICTURE OF MALE URETHRA, UNSPECIFIED STRICTURE TYPE: Primary | ICD-10-CM

## 2025-05-19 PROCEDURE — 3077F SYST BP >= 140 MM HG: CPT | Performed by: UROLOGY

## 2025-05-19 PROCEDURE — 99214 OFFICE O/P EST MOD 30 MIN: CPT | Performed by: UROLOGY

## 2025-05-19 PROCEDURE — 3078F DIAST BP <80 MM HG: CPT | Performed by: UROLOGY

## 2025-05-19 RX ORDER — SILDENAFIL 100 MG/1
100 TABLET, FILM COATED ORAL DAILY PRN
Qty: 10 TABLET | Refills: 2 | Status: SHIPPED | OUTPATIENT
Start: 2025-05-19 | End: 2025-06-18

## 2025-05-19 NOTE — PROGRESS NOTES
CHIEF COMPLAINT:  Patient presents to the office today to discuss:  1. Urethral stricture status post dilation (Optilume)  2. Erectile dysfunction    PAST UROLOGICAL HISTORY:  Patient with history of urethral stricture who underwent urethral dilation. Has been taking tadalafil for 6 months to prevent scar tissue formation and improve urinary flow. Previously reported urinary symptoms and was prescribed medication to address stricture-related issues.    HPI TODAY 05/19/2025:  - Stinging sensation at tip of penis where catheter was placed during dilation procedure.  - Approximately 30% improvement in urinary stream following dilation 4 weeks ago  - Improved bladder emptying.  - No need for additional pain medication.  - Currently taking tadalafil daily as previously prescribed.    PHYSICAL EXAMINATION:  Constitutional: Alert, well-appearing, no acute distress.   exam: Penis - no evidence of fungal infection or inflammation at meatus. Testicles noted to be sensitive to palpation (longstanding issue per patient report).    ASSESSMENT AND PLAN:  Adult man with urethral stricture status post dilation with mild residual symptoms and erectile dysfunction.    1. Urethral stricture (N35.9)  - Assessment: Showing improvement post-dilation with approximately 30% better urinary stream.  - Plan: Schedule cystoscopy in 3 months to evaluate healing. Continue current management with hydration.  - Counseling: Advised to maintain adequate fluid intake.    2. Erectile dysfunction (N52.9)  - Assessment: Currently on tadalafil with suboptimal response.  - Plan: Continue daily tadalafil and add sildenafil (Viagra) as needed. Prescription sent to pharmacy for generic sildenafil.  - Counseling: Instructed to take sildenafil on relatively empty stomach approximately 1 hour before sexual activity. Explained medication facilitates natural erections with stimulation rather than automatically causing erections. May take up to 100mg (4-5 pills  of 20-25mg) but advised to start with lower dose.    ORDERS:  Prescription for generic sildenafil sent to pharmacy.    FOLLOW UP:  Cystoscopy in 3 months to evaluate urethral healing.    SHORT SUMMARY:  Adult man with urethral stricture showing 30% improvement in urinary stream post-dilation. Continuing tadalafil and adding sildenafil for erectile dysfunction with cystoscopy planned in 3 months.

## 2025-06-03 DIAGNOSIS — K21.9 GASTROESOPHAGEAL REFLUX DISEASE WITHOUT ESOPHAGITIS: ICD-10-CM

## 2025-06-03 DIAGNOSIS — F41.0 PANIC ATTACK: ICD-10-CM

## 2025-06-04 RX ORDER — OMEPRAZOLE 20 MG/1
20 CAPSULE, DELAYED RELEASE ORAL DAILY
Qty: 90 CAPSULE | Refills: 0 | Status: SHIPPED | OUTPATIENT
Start: 2025-06-04

## 2025-06-04 RX ORDER — PAROXETINE 20 MG/1
TABLET, FILM COATED ORAL
Qty: 90 TABLET | Refills: 0 | Status: SHIPPED | OUTPATIENT
Start: 2025-06-04

## 2025-06-06 ENCOUNTER — OFFICE VISIT (OUTPATIENT)
Dept: URGENT CARE | Age: 55
End: 2025-06-06
Payer: COMMERCIAL

## 2025-06-06 ENCOUNTER — ANCILLARY PROCEDURE (OUTPATIENT)
Dept: URGENT CARE | Age: 55
End: 2025-06-06
Payer: COMMERCIAL

## 2025-06-06 VITALS
BODY MASS INDEX: 44.1 KG/M2 | OXYGEN SATURATION: 95 % | RESPIRATION RATE: 18 BRPM | SYSTOLIC BLOOD PRESSURE: 146 MMHG | HEIGHT: 71 IN | HEART RATE: 55 BPM | WEIGHT: 315 LBS | DIASTOLIC BLOOD PRESSURE: 85 MMHG

## 2025-06-06 DIAGNOSIS — R07.81 RIB PAIN: Primary | ICD-10-CM

## 2025-06-06 DIAGNOSIS — J40 BRONCHITIS: ICD-10-CM

## 2025-06-06 DIAGNOSIS — R07.81 RIB PAIN: ICD-10-CM

## 2025-06-06 PROCEDURE — 71046 X-RAY EXAM CHEST 2 VIEWS: CPT | Performed by: HOSPITALIST

## 2025-06-06 RX ORDER — AZITHROMYCIN 250 MG/1
TABLET, FILM COATED ORAL
Qty: 6 TABLET | Refills: 0 | Status: SHIPPED | OUTPATIENT
Start: 2025-06-06 | End: 2025-06-06

## 2025-06-06 RX ORDER — PREDNISONE 20 MG/1
40 TABLET ORAL DAILY
Qty: 10 TABLET | Refills: 0 | Status: SHIPPED | OUTPATIENT
Start: 2025-06-06 | End: 2025-06-11

## 2025-06-06 RX ORDER — AZITHROMYCIN 250 MG/1
TABLET, FILM COATED ORAL
Qty: 6 TABLET | Refills: 0 | Status: SHIPPED | OUTPATIENT
Start: 2025-06-06

## 2025-06-07 ASSESSMENT — ENCOUNTER SYMPTOMS
BACK PAIN: 1
EYES NEGATIVE: 1
CONSTITUTIONAL NEGATIVE: 1
COUGH: 1

## 2025-06-07 NOTE — PROGRESS NOTES
"Subjective   Patient ID: Chavo Mariscal is a 54 y.o. male. They present today with a chief complaint of Other (Pain in rt side x 2 days).    History of Present Illness  Pt presents with acute right rib pain which happened when he twisted. He has had a cough which has been productive of purulent sputum for a week. With wheezing and shortness of breath          Past Medical History  Allergies as of 06/06/2025    (No Known Allergies)       Prescriptions Prior to Admission[1]     Medical History[2]    Surgical History[3]     reports that he has never smoked. He has been exposed to tobacco smoke. He has never used smokeless tobacco. He reports current alcohol use. He reports that he does not use drugs.    Review of Systems  Review of Systems   Constitutional: Negative.    HENT:  Positive for congestion.    Eyes: Negative.    Respiratory:  Positive for cough.    Musculoskeletal:  Positive for back pain.                                  Objective    Vitals:    06/06/25 1736   BP: 146/85   Pulse: 55   Resp: 18   SpO2: 95%   Weight: (!) 155 kg (342 lb)   Height: 1.791 m (5' 10.5\")     No LMP for male patient.    Physical Exam  Constitutional:       Appearance: Normal appearance.   HENT:      Head: Normocephalic and atraumatic.      Mouth/Throat:      Mouth: Mucous membranes are moist.   Eyes:      Pupils: Pupils are equal, round, and reactive to light.   Cardiovascular:      Rate and Rhythm: Normal rate and regular rhythm.   Pulmonary:      Breath sounds: Wheezing present.   Musculoskeletal:      Comments: Pain with twisting   Neurological:      Mental Status: He is alert.         Procedures    Point of Care Test & Imaging Results from this visit  No results found for this visit on 06/06/25.   Imaging  XR chest 2 views  Result Date: 6/6/2025  1.  No evidence of acute cardiopulmonary process.     Signed by: Moises Barfield 6/6/2025 6:50 PM Dictation workstation:   [id number]      Cardiology, Vascular, and Other Imaging  No " other imaging results found for the past 2 days      Diagnostic study results (if any) were reviewed by Chelsea Roberts MD.    Assessment/Plan   Allergies, medications, history, and pertinent labs/EKGs/Imaging reviewed by Chelsea Robetrs MD.     Medical Decision Making  Pt with wheezing discussed probabale musculoskeletal strain which is related wot bronchitis and need for treatment  Orders and Diagnoses  Diagnoses and all orders for this visit:  Rib pain  -     XR chest 2 views; Future  -     predniSONE (Deltasone) 20 mg tablet; Take 2 tablets (40 mg) by mouth once daily for 5 days.  Bronchitis  -     azithromycin (Zithromax) 250 mg tablet; Take two pills today and on a day for 4 days  -     predniSONE (Deltasone) 20 mg tablet; Take 2 tablets (40 mg) by mouth once daily for 5 days.      Medical Admin Record      Patient disposition: Home    Electronically signed by Chelsea Roberts MD  7:52 AM           [1] (Not in a hospital admission)  [2]   Past Medical History:  Diagnosis Date    Anxiety     Arrhythmia 09/25/2023    Asthma     CAD (coronary artery disease) 09/24/2023 7/5/2023: Elevated  Agatston units (all in LAD)      Diabetes mellitus, new onset 09/24/2023    Essential hypertension 04/15/2025    GERD (gastroesophageal reflux disease)     Hypercholesterolemia 09/24/2023    Hypertension     Mixed conductive and sensorineural hearing loss, unspecified     Mixed hearing loss    Morbid (severe) obesity due to excess calories (Multi)     Severe obesity (BMI 35.0-35.9 with comorbidity)    Obstructive sleep apnea 07/10/2007    Obstructive sleep apnea (adult) (pediatric)     Obstructive sleep apnea, adult    Periodic limb movement disorder     Periodic limb movement disorder    Personal history of other diseases of the circulatory system     History of cardiac murmur    Personal history of other diseases of the digestive system     History of gastritis    Personal history of other diseases of the  musculoskeletal system and connective tissue     History of arthritis    Personal history of other diseases of the respiratory system     History of asthma    Type 2 diabetes mellitus without complications 07/20/2021    Controlled diabetes mellitus   [3]   Past Surgical History:  Procedure Laterality Date    HERNIA REPAIR  08/22/2017    Hernia Repair    INNER EAR SURGERY  08/22/2017    Inner Ear Surgery    OTHER SURGICAL HISTORY  12/20/2019    Tonsillectomy    OTHER SURGICAL HISTORY  01/16/2019    Colonoscopy    OTHER SURGICAL HISTORY  01/16/2019    Esophagogastroduodenoscopy

## 2025-08-04 ENCOUNTER — APPOINTMENT (OUTPATIENT)
Age: 55
End: 2025-08-04
Payer: COMMERCIAL

## 2025-08-04 VITALS
SYSTOLIC BLOOD PRESSURE: 158 MMHG | WEIGHT: 315 LBS | DIASTOLIC BLOOD PRESSURE: 78 MMHG | BODY MASS INDEX: 49.65 KG/M2 | HEART RATE: 60 BPM | TEMPERATURE: 98 F

## 2025-08-04 DIAGNOSIS — R35.0 URINARY FREQUENCY: Primary | ICD-10-CM

## 2025-08-04 LAB
POC APPEARANCE, URINE: CLEAR
POC BILIRUBIN, URINE: NEGATIVE
POC BLOOD, URINE: ABNORMAL
POC COLOR, URINE: YELLOW
POC GLUCOSE, URINE: NEGATIVE MG/DL
POC KETONES, URINE: NEGATIVE MG/DL
POC LEUKOCYTES, URINE: NEGATIVE
POC NITRITE,URINE: NEGATIVE
POC PH, URINE: 5.5 PH
POC PROTEIN, URINE: NEGATIVE MG/DL
POC SPECIFIC GRAVITY, URINE: 1.02
POC UROBILINOGEN, URINE: 0.2 EU/DL

## 2025-08-04 PROCEDURE — 81003 URINALYSIS AUTO W/O SCOPE: CPT | Performed by: UROLOGY

## 2025-08-04 PROCEDURE — 52000 CYSTOURETHROSCOPY: CPT | Performed by: UROLOGY

## 2025-08-27 DIAGNOSIS — K21.9 GASTROESOPHAGEAL REFLUX DISEASE WITHOUT ESOPHAGITIS: ICD-10-CM

## 2025-08-28 RX ORDER — OMEPRAZOLE 20 MG/1
20 CAPSULE, DELAYED RELEASE ORAL DAILY
Qty: 90 CAPSULE | Refills: 0 | Status: SHIPPED | OUTPATIENT
Start: 2025-08-28

## 2025-10-06 ENCOUNTER — APPOINTMENT (OUTPATIENT)
Dept: PRIMARY CARE | Facility: CLINIC | Age: 55
End: 2025-10-06
Payer: COMMERCIAL

## 2025-10-08 ENCOUNTER — APPOINTMENT (OUTPATIENT)
Dept: CARDIOLOGY | Facility: CLINIC | Age: 55
End: 2025-10-08
Payer: COMMERCIAL

## 2026-02-16 ENCOUNTER — APPOINTMENT (OUTPATIENT)
Age: 56
End: 2026-02-16
Payer: COMMERCIAL

## (undated) DEVICE — Device

## (undated) DEVICE — CATHETER, URETHRAL, FOLEY, 2 WAY, BARDEX IC, 16 FR, 5 CC, SILICONE

## (undated) DEVICE — GUIDEWIRE, DUAL SENSOR, .035 X 150 STRAIGHT,  3CM

## (undated) DEVICE — DILATOR SET, S-CURVED, URETHRAL,  8.0-20.0

## (undated) DEVICE — SLEEVE, VASO PRESS, CALF GARMENT, LARGE, GREEN

## (undated) DEVICE — COLLECTION BAG, FLUID, NON-STERILE

## (undated) DEVICE — BAG, DRAINAGE, URINARY, W/MONO-FLO ANTI-REFLUX DEVICE, NEEDLESS SAMPLING PORT,SPLASHGUARD II/SAFEGUARD, 2000 CC, STERILE, LF